# Patient Record
Sex: FEMALE | Race: BLACK OR AFRICAN AMERICAN | NOT HISPANIC OR LATINO | URBAN - METROPOLITAN AREA
[De-identification: names, ages, dates, MRNs, and addresses within clinical notes are randomized per-mention and may not be internally consistent; named-entity substitution may affect disease eponyms.]

---

## 2023-08-17 ENCOUNTER — APPOINTMENT (OUTPATIENT)
Dept: LAB | Facility: LAB | Age: 58
End: 2023-08-17
Payer: COMMERCIAL

## 2023-08-17 LAB
ALANINE AMINOTRANSFERASE (SGPT) (U/L) IN SER/PLAS: 11 U/L (ref 7–45)
ALBUMIN (G/DL) IN SER/PLAS: 3.7 G/DL (ref 3.4–5)
ALKALINE PHOSPHATASE (U/L) IN SER/PLAS: 113 U/L (ref 33–110)
ANION GAP IN SER/PLAS: 14 MMOL/L (ref 10–20)
ANTI-DNA (DS): 108 IU/ML
APPEARANCE, URINE: ABNORMAL
ASPARTATE AMINOTRANSFERASE (SGOT) (U/L) IN SER/PLAS: 17 U/L (ref 9–39)
BACTERIA, URINE: ABNORMAL /HPF
BASOPHILS (10*3/UL) IN BLOOD BY AUTOMATED COUNT: 0.04 X10E9/L (ref 0–0.1)
BASOPHILS/100 LEUKOCYTES IN BLOOD BY AUTOMATED COUNT: 0.9 % (ref 0–2)
BILIRUBIN TOTAL (MG/DL) IN SER/PLAS: 0.4 MG/DL (ref 0–1.2)
BILIRUBIN, URINE: NEGATIVE
BLOOD, URINE: NEGATIVE
C REACTIVE PROTEIN (MG/L) IN SER/PLAS: 1.1 MG/DL
CALCIDIOL (25 OH VITAMIN D3) (NG/ML) IN SER/PLAS: 21 NG/ML
CALCIUM (MG/DL) IN SER/PLAS: 9.5 MG/DL (ref 8.6–10.6)
CARBON DIOXIDE, TOTAL (MMOL/L) IN SER/PLAS: 29 MMOL/L (ref 21–32)
CHLORIDE (MMOL/L) IN SER/PLAS: 98 MMOL/L (ref 98–107)
COLOR, URINE: YELLOW
COMPLEMENT C3 (MG/DL) IN SER/PLAS: 143 MG/DL (ref 87–200)
COMPLEMENT C4 (MG/DL) IN SER/PLAS: 36 MG/DL (ref 10–50)
CREATININE (MG/DL) IN SER/PLAS: 0.67 MG/DL (ref 0.5–1.05)
CREATININE (MG/DL) IN URINE: 112 MG/DL (ref 20–320)
EOSINOPHILS (10*3/UL) IN BLOOD BY AUTOMATED COUNT: 0.07 X10E9/L (ref 0–0.7)
EOSINOPHILS/100 LEUKOCYTES IN BLOOD BY AUTOMATED COUNT: 1.5 % (ref 0–6)
ERYTHROCYTE DISTRIBUTION WIDTH (RATIO) BY AUTOMATED COUNT: 14.2 % (ref 11.5–14.5)
ERYTHROCYTE MEAN CORPUSCULAR HEMOGLOBIN CONCENTRATION (G/DL) BY AUTOMATED: 33.6 G/DL (ref 32–36)
ERYTHROCYTE MEAN CORPUSCULAR VOLUME (FL) BY AUTOMATED COUNT: 85 FL (ref 80–100)
ERYTHROCYTES (10*6/UL) IN BLOOD BY AUTOMATED COUNT: 5.13 X10E12/L (ref 4–5.2)
GFR FEMALE: >90 ML/MIN/1.73M2
GLUCOSE (MG/DL) IN SER/PLAS: 84 MG/DL (ref 74–99)
GLUCOSE, URINE: NEGATIVE MG/DL
HEMATOCRIT (%) IN BLOOD BY AUTOMATED COUNT: 43.5 % (ref 36–46)
HEMOGLOBIN (G/DL) IN BLOOD: 14.6 G/DL (ref 12–16)
IMMATURE GRANULOCYTES/100 LEUKOCYTES IN BLOOD BY AUTOMATED COUNT: 0.2 % (ref 0–0.9)
KETONES, URINE: NEGATIVE MG/DL
LEUKOCYTE ESTERASE, URINE: ABNORMAL
LEUKOCYTES (10*3/UL) IN BLOOD BY AUTOMATED COUNT: 4.7 X10E9/L (ref 4.4–11.3)
LYMPHOCYTES (10*3/UL) IN BLOOD BY AUTOMATED COUNT: 1.46 X10E9/L (ref 1.2–4.8)
LYMPHOCYTES/100 LEUKOCYTES IN BLOOD BY AUTOMATED COUNT: 31.4 % (ref 13–44)
MONOCYTES (10*3/UL) IN BLOOD BY AUTOMATED COUNT: 0.39 X10E9/L (ref 0.1–1)
MONOCYTES/100 LEUKOCYTES IN BLOOD BY AUTOMATED COUNT: 8.4 % (ref 2–10)
NEUTROPHILS (10*3/UL) IN BLOOD BY AUTOMATED COUNT: 2.68 X10E9/L (ref 1.2–7.7)
NEUTROPHILS/100 LEUKOCYTES IN BLOOD BY AUTOMATED COUNT: 57.6 % (ref 40–80)
NITRITE, URINE: NEGATIVE
NRBC (PER 100 WBCS) BY AUTOMATED COUNT: 0 /100 WBC (ref 0–0)
PH, URINE: 7 (ref 5–8)
PLATELETS (10*3/UL) IN BLOOD AUTOMATED COUNT: 430 X10E9/L (ref 150–450)
POTASSIUM (MMOL/L) IN SER/PLAS: 4.4 MMOL/L (ref 3.5–5.3)
PROTEIN (MG/DL) IN URINE: 22 MG/DL (ref 5–24)
PROTEIN TOTAL: 8.3 G/DL (ref 6.4–8.2)
PROTEIN, URINE: ABNORMAL MG/DL
PROTEIN/CREATININE (MG/MG) IN URINE: 0.2 MG/MG CREAT (ref 0–0.17)
RBC, URINE: <1 /HPF (ref 0–5)
SEDIMENTATION RATE, ERYTHROCYTE: 60 MM/H (ref 0–30)
SODIUM (MMOL/L) IN SER/PLAS: 137 MMOL/L (ref 136–145)
SPECIFIC GRAVITY, URINE: 1.01 (ref 1–1.03)
SQUAMOUS EPITHELIAL CELLS, URINE: 1 /HPF
UREA NITROGEN (MG/DL) IN SER/PLAS: 8 MG/DL (ref 6–23)
UROBILINOGEN, URINE: <2 MG/DL (ref 0–1.9)
WBC, URINE: 3 /HPF (ref 0–5)

## 2023-10-03 ENCOUNTER — APPOINTMENT (OUTPATIENT)
Dept: RADIOLOGY | Facility: HOSPITAL | Age: 58
End: 2023-10-03
Payer: COMMERCIAL

## 2023-10-03 ENCOUNTER — HOSPITAL ENCOUNTER (OUTPATIENT)
Facility: HOSPITAL | Age: 58
Setting detail: OBSERVATION
LOS: 1 days | Discharge: HOME | End: 2023-10-05
Attending: EMERGENCY MEDICINE | Admitting: NURSE PRACTITIONER
Payer: COMMERCIAL

## 2023-10-03 ENCOUNTER — NURSE TRIAGE (OUTPATIENT)
Dept: HOME HEALTH SERVICES | Age: 58
End: 2023-10-03

## 2023-10-03 DIAGNOSIS — J30.2 SEASONAL ALLERGIES: ICD-10-CM

## 2023-10-03 DIAGNOSIS — M32.15: ICD-10-CM

## 2023-10-03 DIAGNOSIS — M54.16 LUMBAR RADICULOPATHY: ICD-10-CM

## 2023-10-03 DIAGNOSIS — I10 PRIMARY HYPERTENSION: ICD-10-CM

## 2023-10-03 DIAGNOSIS — J96.01 ACUTE HYPOXIC RESPIRATORY FAILURE (MULTI): ICD-10-CM

## 2023-10-03 DIAGNOSIS — J44.1 CHRONIC OBSTRUCTIVE PULMONARY DISEASE WITH ACUTE EXACERBATION (MULTI): Primary | ICD-10-CM

## 2023-10-03 PROBLEM — R25.2 CRAMPING OF HANDS: Status: RESOLVED | Noted: 2023-10-03 | Resolved: 2023-10-03

## 2023-10-03 PROBLEM — R42 VERTIGO: Status: RESOLVED | Noted: 2023-10-03 | Resolved: 2023-10-03

## 2023-10-03 PROBLEM — M19.90 INFLAMMATORY ARTHRITIS: Status: ACTIVE | Noted: 2021-09-29

## 2023-10-03 PROBLEM — M17.0 LOCALIZED OSTEOARTHRITIS OF KNEES, BILATERAL: Status: RESOLVED | Noted: 2023-10-03 | Resolved: 2023-10-03

## 2023-10-03 PROBLEM — R00.2 PALPITATIONS: Status: RESOLVED | Noted: 2023-10-03 | Resolved: 2023-10-03

## 2023-10-03 PROBLEM — R59.1 LYMPHADENOPATHY: Status: ACTIVE | Noted: 2023-10-03

## 2023-10-03 PROBLEM — R53.81 PHYSICAL DECONDITIONING: Status: RESOLVED | Noted: 2023-10-03 | Resolved: 2023-10-03

## 2023-10-03 PROBLEM — M35.9 UNDIFFERENTIATED CONNECTIVE TISSUE DISEASE (MULTI): Status: RESOLVED | Noted: 2023-10-03 | Resolved: 2023-10-03

## 2023-10-03 PROBLEM — H52.4 HYPEROPIA WITH PRESBYOPIA OF BOTH EYES: Status: ACTIVE | Noted: 2023-10-03

## 2023-10-03 PROBLEM — L29.9 PRURITUS: Status: RESOLVED | Noted: 2021-09-29 | Resolved: 2023-10-03

## 2023-10-03 PROBLEM — H18.413 ARCUS SENILIS, BILATERAL: Status: ACTIVE | Noted: 2023-10-03

## 2023-10-03 PROBLEM — M25.561 KNEE PAIN, BILATERAL: Status: RESOLVED | Noted: 2023-10-03 | Resolved: 2023-10-03

## 2023-10-03 PROBLEM — M54.30 SCIATICA: Status: RESOLVED | Noted: 2023-10-03 | Resolved: 2023-10-03

## 2023-10-03 PROBLEM — R06.02 SHORTNESS OF BREATH ON EXERTION: Status: RESOLVED | Noted: 2023-10-03 | Resolved: 2023-10-03

## 2023-10-03 PROBLEM — N95.0 POST-MENOPAUSAL BLEEDING: Status: RESOLVED | Noted: 2023-10-03 | Resolved: 2023-10-03

## 2023-10-03 PROBLEM — R73.03 PREDIABETES: Status: RESOLVED | Noted: 2023-10-03 | Resolved: 2023-10-03

## 2023-10-03 PROBLEM — M62.838 MUSCLE SPASM: Status: RESOLVED | Noted: 2023-10-03 | Resolved: 2023-10-03

## 2023-10-03 PROBLEM — R07.9 EXERTIONAL CHEST PAIN: Status: RESOLVED | Noted: 2023-10-03 | Resolved: 2023-10-03

## 2023-10-03 PROBLEM — E55.9 VITAMIN D DEFICIENCY: Status: ACTIVE | Noted: 2023-10-03

## 2023-10-03 PROBLEM — F32.9 MDD (MAJOR DEPRESSIVE DISORDER): Status: ACTIVE | Noted: 2023-10-03

## 2023-10-03 PROBLEM — F41.9 ANXIETY: Status: ACTIVE | Noted: 2023-10-03

## 2023-10-03 PROBLEM — F41.1 GAD (GENERALIZED ANXIETY DISORDER): Status: ACTIVE | Noted: 2023-10-03

## 2023-10-03 PROBLEM — L85.3 XEROSIS CUTIS: Status: ACTIVE | Noted: 2021-09-29

## 2023-10-03 PROBLEM — N84.0 ENDOMETRIAL POLYP: Status: RESOLVED | Noted: 2023-10-03 | Resolved: 2023-10-03

## 2023-10-03 PROBLEM — M25.562 KNEE PAIN, BILATERAL: Status: RESOLVED | Noted: 2023-10-03 | Resolved: 2023-10-03

## 2023-10-03 PROBLEM — E66.9 OBESITY: Status: RESOLVED | Noted: 2023-10-03 | Resolved: 2023-10-03

## 2023-10-03 PROBLEM — M32.9 SLE (SYSTEMIC LUPUS ERYTHEMATOSUS) (MULTI): Status: ACTIVE | Noted: 2023-10-03

## 2023-10-03 PROBLEM — S46.001A INJURY OF RIGHT ROTATOR CUFF: Status: RESOLVED | Noted: 2023-10-03 | Resolved: 2023-10-03

## 2023-10-03 PROBLEM — E07.89 THYROID FULLNESS: Status: RESOLVED | Noted: 2023-10-03 | Resolved: 2023-10-03

## 2023-10-03 PROBLEM — M25.50 JOINT PAIN: Status: RESOLVED | Noted: 2023-10-03 | Resolved: 2023-10-03

## 2023-10-03 PROBLEM — L65.9 NONSCARRING HAIR LOSS, UNSPECIFIED: Status: RESOLVED | Noted: 2021-09-29 | Resolved: 2023-10-03

## 2023-10-03 PROBLEM — E78.5 HLD (HYPERLIPIDEMIA): Status: ACTIVE | Noted: 2023-10-03

## 2023-10-03 PROBLEM — D48.5 NEOPLASM OF UNCERTAIN BEHAVIOR OF SKIN: Status: ACTIVE | Noted: 2021-09-29

## 2023-10-03 PROBLEM — E04.9 GOITER: Status: ACTIVE | Noted: 2023-10-03

## 2023-10-03 PROBLEM — N39.0 URINARY TRACT INFECTION: Status: RESOLVED | Noted: 2023-10-03 | Resolved: 2023-10-03

## 2023-10-03 PROBLEM — D22.9 ATYPICAL MOLE: Status: RESOLVED | Noted: 2023-10-03 | Resolved: 2023-10-03

## 2023-10-03 PROBLEM — H25.13 NUCLEAR SCLEROSIS OF BOTH EYES: Status: RESOLVED | Noted: 2023-10-03 | Resolved: 2023-10-03

## 2023-10-03 PROBLEM — L82.1 OTHER SEBORRHEIC KERATOSIS: Status: RESOLVED | Noted: 2021-09-29 | Resolved: 2023-10-03

## 2023-10-03 PROBLEM — J44.9 COPD (CHRONIC OBSTRUCTIVE PULMONARY DISEASE) (MULTI): Status: ACTIVE | Noted: 2023-10-03

## 2023-10-03 PROBLEM — G51.4 EYELID MYOKYMIA: Status: ACTIVE | Noted: 2023-10-03

## 2023-10-03 PROBLEM — H52.03 HYPEROPIA WITH PRESBYOPIA OF BOTH EYES: Status: ACTIVE | Noted: 2023-10-03

## 2023-10-03 LAB
ALBUMIN SERPL BCP-MCNC: 4 G/DL (ref 3.4–5)
ALP SERPL-CCNC: 124 U/L (ref 33–110)
ALT SERPL W P-5'-P-CCNC: 8 U/L (ref 7–45)
ANION GAP BLDV CALCULATED.4IONS-SCNC: 6 MMOL/L (ref 10–25)
ANION GAP SERPL CALC-SCNC: 15 MMOL/L (ref 10–20)
AST SERPL W P-5'-P-CCNC: 13 U/L (ref 9–39)
BASE EXCESS BLDV CALC-SCNC: 5.6 MMOL/L (ref -2–3)
BASOPHILS # BLD AUTO: 0.03 X10*3/UL (ref 0–0.1)
BASOPHILS NFR BLD AUTO: 0.3 %
BILIRUB SERPL-MCNC: 0.4 MG/DL (ref 0–1.2)
BNP SERPL-MCNC: 8 PG/ML (ref 0–99)
BODY TEMPERATURE: 37 DEGREES CELSIUS
BUN SERPL-MCNC: 13 MG/DL (ref 6–23)
CA-I BLDV-SCNC: 1.15 MMOL/L (ref 1.1–1.33)
CALCIUM SERPL-MCNC: 9.5 MG/DL (ref 8.6–10.6)
CARDIAC TROPONIN I PNL SERPL HS: <3 NG/L (ref 0–34)
CARDIAC TROPONIN I PNL SERPL HS: <3 NG/L (ref 0–34)
CHLORIDE BLDV-SCNC: 97 MMOL/L (ref 98–107)
CHLORIDE SERPL-SCNC: 94 MMOL/L (ref 98–107)
CO2 SERPL-SCNC: 29 MMOL/L (ref 21–32)
CREAT SERPL-MCNC: 0.73 MG/DL (ref 0.5–1.05)
EOSINOPHIL # BLD AUTO: 0 X10*3/UL (ref 0–0.7)
EOSINOPHIL NFR BLD AUTO: 0 %
ERYTHROCYTE [DISTWIDTH] IN BLOOD BY AUTOMATED COUNT: 13.9 % (ref 11.5–14.5)
FLUAV RNA RESP QL NAA+PROBE: NOT DETECTED
FLUBV RNA RESP QL NAA+PROBE: NOT DETECTED
GFR SERPL CREATININE-BSD FRML MDRD: >90 ML/MIN/1.73M*2
GLUCOSE BLDV-MCNC: 178 MG/DL (ref 74–99)
GLUCOSE SERPL-MCNC: 153 MG/DL (ref 74–99)
HCO3 BLDV-SCNC: 32.5 MMOL/L (ref 22–26)
HCT VFR BLD AUTO: 41.4 % (ref 36–46)
HCT VFR BLD EST: 45 % (ref 36–46)
HGB BLD-MCNC: 15 G/DL (ref 12–16)
HGB BLDV-MCNC: 15.1 G/DL (ref 12–16)
IMM GRANULOCYTES # BLD AUTO: 0.04 X10*3/UL (ref 0–0.7)
IMM GRANULOCYTES NFR BLD AUTO: 0.4 % (ref 0–0.9)
LACTATE BLDV-SCNC: 1.6 MMOL/L (ref 0.4–2)
LYMPHOCYTES # BLD AUTO: 0.51 X10*3/UL (ref 1.2–4.8)
LYMPHOCYTES NFR BLD AUTO: 4.7 %
MAGNESIUM SERPL-MCNC: 1.97 MG/DL (ref 1.6–2.4)
MCH RBC QN AUTO: 29.6 PG (ref 26–34)
MCHC RBC AUTO-ENTMCNC: 36.2 G/DL (ref 32–36)
MCV RBC AUTO: 82 FL (ref 80–100)
MONOCYTES # BLD AUTO: 0.72 X10*3/UL (ref 0.1–1)
MONOCYTES NFR BLD AUTO: 6.6 %
NEUTROPHILS # BLD AUTO: 9.55 X10*3/UL (ref 1.2–7.7)
NEUTROPHILS NFR BLD AUTO: 88 %
NRBC BLD-RTO: 0 /100 WBCS (ref 0–0)
OXYHGB MFR BLDV: 37.4 % (ref 45–75)
PCO2 BLDV: 55 MM HG (ref 41–51)
PH BLDV: 7.38 PH (ref 7.33–7.43)
PLATELET # BLD AUTO: 388 X10*3/UL (ref 150–450)
PMV BLD AUTO: 9.5 FL (ref 7.5–11.5)
PO2 BLDV: 28 MM HG (ref 35–45)
POTASSIUM BLDV-SCNC: 3.7 MMOL/L (ref 3.5–5.3)
POTASSIUM SERPL-SCNC: 3.8 MMOL/L (ref 3.5–5.3)
PROT SERPL-MCNC: 8.6 G/DL (ref 6.4–8.2)
RBC # BLD AUTO: 5.06 X10*6/UL (ref 4–5.2)
SAO2 % BLDV: 41 % (ref 45–75)
SARS-COV-2 RNA RESP QL NAA+PROBE: NOT DETECTED
SODIUM BLDV-SCNC: 132 MMOL/L (ref 136–145)
SODIUM SERPL-SCNC: 134 MMOL/L (ref 136–145)
WBC # BLD AUTO: 10.9 X10*3/UL (ref 4.4–11.3)

## 2023-10-03 PROCEDURE — 2500000001 HC RX 250 WO HCPCS SELF ADMINISTERED DRUGS (ALT 637 FOR MEDICARE OP): Mod: SE

## 2023-10-03 PROCEDURE — 93010 ELECTROCARDIOGRAM REPORT: CPT | Performed by: EMERGENCY MEDICINE

## 2023-10-03 PROCEDURE — 2500000001 HC RX 250 WO HCPCS SELF ADMINISTERED DRUGS (ALT 637 FOR MEDICARE OP): Performed by: NURSE PRACTITIONER

## 2023-10-03 PROCEDURE — 85018 HEMOGLOBIN: CPT

## 2023-10-03 PROCEDURE — 99285 EMERGENCY DEPT VISIT HI MDM: CPT | Mod: CS | Performed by: EMERGENCY MEDICINE

## 2023-10-03 PROCEDURE — 87636 SARSCOV2 & INF A&B AMP PRB: CPT

## 2023-10-03 PROCEDURE — 99285 EMERGENCY DEPT VISIT HI MDM: CPT | Performed by: EMERGENCY MEDICINE

## 2023-10-03 PROCEDURE — 2500000004 HC RX 250 GENERAL PHARMACY W/ HCPCS (ALT 636 FOR OP/ED)

## 2023-10-03 PROCEDURE — 93308 TTE F-UP OR LMTD: CPT | Performed by: EMERGENCY MEDICINE

## 2023-10-03 PROCEDURE — 71275 CT ANGIOGRAPHY CHEST: CPT | Performed by: RADIOLOGY

## 2023-10-03 PROCEDURE — 2500000002 HC RX 250 W HCPCS SELF ADMINISTERED DRUGS (ALT 637 FOR MEDICARE OP, ALT 636 FOR OP/ED): Mod: SE

## 2023-10-03 PROCEDURE — 82805 BLOOD GASES W/O2 SATURATION: CPT

## 2023-10-03 PROCEDURE — 2500000002 HC RX 250 W HCPCS SELF ADMINISTERED DRUGS (ALT 637 FOR MEDICARE OP, ALT 636 FOR OP/ED): Performed by: NURSE PRACTITIONER

## 2023-10-03 PROCEDURE — 2500000005 HC RX 250 GENERAL PHARMACY W/O HCPCS: Mod: SE

## 2023-10-03 PROCEDURE — G0378 HOSPITAL OBSERVATION PER HR: HCPCS

## 2023-10-03 PROCEDURE — 1210000001 HC SEMI-PRIVATE ROOM DAILY

## 2023-10-03 PROCEDURE — 84484 ASSAY OF TROPONIN QUANT: CPT

## 2023-10-03 PROCEDURE — 2500000004 HC RX 250 GENERAL PHARMACY W/ HCPCS (ALT 636 FOR OP/ED): Mod: SE

## 2023-10-03 PROCEDURE — 36415 COLL VENOUS BLD VENIPUNCTURE: CPT

## 2023-10-03 PROCEDURE — 85025 COMPLETE CBC W/AUTO DIFF WBC: CPT

## 2023-10-03 PROCEDURE — 71046 X-RAY EXAM CHEST 2 VIEWS: CPT | Mod: FY

## 2023-10-03 PROCEDURE — 83735 ASSAY OF MAGNESIUM: CPT

## 2023-10-03 PROCEDURE — 84295 ASSAY OF SERUM SODIUM: CPT

## 2023-10-03 PROCEDURE — 84075 ASSAY ALKALINE PHOSPHATASE: CPT

## 2023-10-03 PROCEDURE — 71275 CT ANGIOGRAPHY CHEST: CPT

## 2023-10-03 PROCEDURE — 76604 US EXAM CHEST: CPT

## 2023-10-03 PROCEDURE — 2550000001 HC RX 255 CONTRASTS: Mod: SE

## 2023-10-03 PROCEDURE — 84132 ASSAY OF SERUM POTASSIUM: CPT | Mod: 59

## 2023-10-03 PROCEDURE — 71046 X-RAY EXAM CHEST 2 VIEWS: CPT | Performed by: STUDENT IN AN ORGANIZED HEALTH CARE EDUCATION/TRAINING PROGRAM

## 2023-10-03 PROCEDURE — 99223 1ST HOSP IP/OBS HIGH 75: CPT | Performed by: NURSE PRACTITIONER

## 2023-10-03 PROCEDURE — 80053 COMPREHEN METABOLIC PANEL: CPT

## 2023-10-03 PROCEDURE — 83880 ASSAY OF NATRIURETIC PEPTIDE: CPT

## 2023-10-03 RX ORDER — GABAPENTIN 300 MG/1
300 CAPSULE ORAL 3 TIMES DAILY
COMMUNITY
Start: 2023-08-17

## 2023-10-03 RX ORDER — DICLOFENAC SODIUM 10 MG/G
4 GEL TOPICAL 4 TIMES DAILY PRN
COMMUNITY
Start: 2023-02-11 | End: 2023-10-03 | Stop reason: WASHOUT

## 2023-10-03 RX ORDER — DICLOFENAC SODIUM 10 MG/G
4 GEL TOPICAL 4 TIMES DAILY
Status: DISCONTINUED | OUTPATIENT
Start: 2023-10-03 | End: 2023-10-05 | Stop reason: HOSPADM

## 2023-10-03 RX ORDER — LIDOCAINE 560 MG/1
1 PATCH PERCUTANEOUS; TOPICAL; TRANSDERMAL DAILY
COMMUNITY
End: 2023-10-05 | Stop reason: HOSPADM

## 2023-10-03 RX ORDER — LISINOPRIL 20 MG/1
20 TABLET ORAL DAILY
COMMUNITY
End: 2023-10-03 | Stop reason: ALTCHOICE

## 2023-10-03 RX ORDER — PREDNISONE 10 MG/1
40 TABLET ORAL DAILY
Status: DISCONTINUED | OUTPATIENT
Start: 2023-10-04 | End: 2023-10-05 | Stop reason: HOSPADM

## 2023-10-03 RX ORDER — HYDROXYCHLOROQUINE SULFATE 200 MG/1
1 TABLET, FILM COATED ORAL 2 TIMES DAILY
COMMUNITY
Start: 2015-10-12 | End: 2023-10-03 | Stop reason: WASHOUT

## 2023-10-03 RX ORDER — CYCLOBENZAPRINE HCL 5 MG
5 TABLET ORAL NIGHTLY PRN
COMMUNITY
End: 2023-10-03 | Stop reason: ALTCHOICE

## 2023-10-03 RX ORDER — PREDNISONE 20 MG/1
40 TABLET ORAL ONCE
Status: COMPLETED | OUTPATIENT
Start: 2023-10-03 | End: 2023-10-03

## 2023-10-03 RX ORDER — GABAPENTIN 300 MG/1
300 CAPSULE ORAL 3 TIMES DAILY
Status: DISCONTINUED | OUTPATIENT
Start: 2023-10-03 | End: 2023-10-03

## 2023-10-03 RX ORDER — LOSARTAN POTASSIUM 25 MG/1
25 TABLET ORAL DAILY
Status: DISCONTINUED | OUTPATIENT
Start: 2023-10-03 | End: 2023-10-05 | Stop reason: HOSPADM

## 2023-10-03 RX ORDER — ACETAMINOPHEN 160 MG
10 TABLET,CHEWABLE ORAL DAILY
Status: DISCONTINUED | OUTPATIENT
Start: 2023-10-03 | End: 2023-10-05 | Stop reason: HOSPADM

## 2023-10-03 RX ORDER — BISMUTH SUBSALICYLATE 262 MG
1 TABLET,CHEWABLE ORAL DAILY
Status: DISCONTINUED | OUTPATIENT
Start: 2023-10-03 | End: 2023-10-04 | Stop reason: CLARIF

## 2023-10-03 RX ORDER — GABAPENTIN 300 MG/1
300 CAPSULE ORAL 3 TIMES DAILY PRN
Status: DISCONTINUED | OUTPATIENT
Start: 2023-10-03 | End: 2023-10-05 | Stop reason: HOSPADM

## 2023-10-03 RX ORDER — PANTOPRAZOLE SODIUM 40 MG/1
40 TABLET, DELAYED RELEASE ORAL
Status: DISCONTINUED | OUTPATIENT
Start: 2023-10-04 | End: 2023-10-05 | Stop reason: HOSPADM

## 2023-10-03 RX ORDER — HYDROCHLOROTHIAZIDE 25 MG/1
25 TABLET ORAL DAILY
Status: DISCONTINUED | OUTPATIENT
Start: 2023-10-03 | End: 2023-10-05 | Stop reason: HOSPADM

## 2023-10-03 RX ORDER — DULOXETIN HYDROCHLORIDE 60 MG/1
60 CAPSULE, DELAYED RELEASE ORAL DAILY
Status: ON HOLD | COMMUNITY
Start: 2023-08-02 | End: 2023-10-05 | Stop reason: SDUPTHER

## 2023-10-03 RX ORDER — HYDROXYCHLOROQUINE SULFATE 200 MG/1
200 TABLET, FILM COATED ORAL 2 TIMES DAILY
Status: DISCONTINUED | OUTPATIENT
Start: 2023-10-03 | End: 2023-10-05 | Stop reason: HOSPADM

## 2023-10-03 RX ORDER — LORAZEPAM 2 MG/ML
1 INJECTION INTRAMUSCULAR ONCE
Status: COMPLETED | OUTPATIENT
Start: 2023-10-03 | End: 2023-10-03

## 2023-10-03 RX ORDER — METHOTREXATE 2.5 MG/1
10 TABLET ORAL
COMMUNITY
Start: 2023-02-11 | End: 2023-10-03 | Stop reason: WASHOUT

## 2023-10-03 RX ORDER — ALBUTEROL SULFATE 0.83 MG/ML
2.5 SOLUTION RESPIRATORY (INHALATION) EVERY 4 HOURS PRN
Status: DISCONTINUED | OUTPATIENT
Start: 2023-10-03 | End: 2023-10-05 | Stop reason: HOSPADM

## 2023-10-03 RX ORDER — LORAZEPAM 0.5 MG/1
0.5 TABLET ORAL EVERY 8 HOURS PRN
Status: DISCONTINUED | OUTPATIENT
Start: 2023-10-03 | End: 2023-10-05 | Stop reason: HOSPADM

## 2023-10-03 RX ORDER — LORAZEPAM 0.5 MG/1
0.5 TABLET ORAL EVERY 8 HOURS PRN
COMMUNITY
End: 2024-01-10 | Stop reason: SDUPTHER

## 2023-10-03 RX ORDER — HYDROCHLOROTHIAZIDE 25 MG/1
1 TABLET ORAL DAILY
Status: ON HOLD | COMMUNITY
Start: 2014-06-03 | End: 2023-10-05 | Stop reason: SDUPTHER

## 2023-10-03 RX ORDER — LIDOCAINE 560 MG/1
1 PATCH PERCUTANEOUS; TOPICAL; TRANSDERMAL DAILY
Status: DISCONTINUED | OUTPATIENT
Start: 2023-10-03 | End: 2023-10-05 | Stop reason: HOSPADM

## 2023-10-03 RX ORDER — MECLIZINE HYDROCHLORIDE 25 MG/1
25 TABLET ORAL EVERY 8 HOURS
COMMUNITY
Start: 2023-01-16

## 2023-10-03 RX ORDER — IPRATROPIUM BROMIDE AND ALBUTEROL SULFATE 2.5; .5 MG/3ML; MG/3ML
3 SOLUTION RESPIRATORY (INHALATION)
Status: DISCONTINUED | OUTPATIENT
Start: 2023-10-03 | End: 2023-10-05 | Stop reason: HOSPADM

## 2023-10-03 RX ORDER — DULOXETIN HYDROCHLORIDE 60 MG/1
60 CAPSULE, DELAYED RELEASE ORAL DAILY
Status: DISCONTINUED | OUTPATIENT
Start: 2023-10-03 | End: 2023-10-05 | Stop reason: HOSPADM

## 2023-10-03 RX ORDER — LORAZEPAM 2 MG/ML
INJECTION INTRAMUSCULAR
Status: COMPLETED
Start: 2023-10-03 | End: 2023-10-03

## 2023-10-03 RX ORDER — FLUTICASONE PROPIONATE 50 MCG
1 SPRAY, SUSPENSION (ML) NASAL DAILY
COMMUNITY
Start: 2023-03-20

## 2023-10-03 RX ORDER — FLUTICASONE PROPIONATE 50 MCG
1 SPRAY, SUSPENSION (ML) NASAL DAILY
Status: DISCONTINUED | OUTPATIENT
Start: 2023-10-03 | End: 2023-10-05 | Stop reason: HOSPADM

## 2023-10-03 RX ORDER — NAPROXEN SODIUM 220 MG/1
324 TABLET, FILM COATED ORAL ONCE
Status: COMPLETED | OUTPATIENT
Start: 2023-10-03 | End: 2023-10-03

## 2023-10-03 RX ORDER — IPRATROPIUM BROMIDE AND ALBUTEROL SULFATE 2.5; .5 MG/3ML; MG/3ML
3 SOLUTION RESPIRATORY (INHALATION) EVERY 20 MIN
Status: COMPLETED | OUTPATIENT
Start: 2023-10-03 | End: 2023-10-03

## 2023-10-03 RX ORDER — LISINOPRIL 20 MG/1
20 TABLET ORAL DAILY
Status: DISCONTINUED | OUTPATIENT
Start: 2023-10-03 | End: 2023-10-03

## 2023-10-03 RX ORDER — LOSARTAN POTASSIUM 25 MG/1
25 TABLET ORAL DAILY
Status: ON HOLD | COMMUNITY
End: 2023-10-05 | Stop reason: SDUPTHER

## 2023-10-03 RX ORDER — AZITHROMYCIN 500 MG/1
500 TABLET, FILM COATED ORAL
Status: DISCONTINUED | OUTPATIENT
Start: 2023-10-03 | End: 2023-10-05 | Stop reason: HOSPADM

## 2023-10-03 RX ADMIN — Medication: at 06:10

## 2023-10-03 RX ADMIN — IOHEXOL 70 ML: 350 INJECTION, SOLUTION INTRAVENOUS at 10:09

## 2023-10-03 RX ADMIN — PREDNISONE 40 MG: 20 TABLET ORAL at 06:19

## 2023-10-03 RX ADMIN — AZITHROMYCIN MONOHYDRATE 500 MG: 500 TABLET ORAL at 15:31

## 2023-10-03 RX ADMIN — FLUTICASONE PROPIONATE 1 SPRAY: 50 SPRAY, METERED NASAL at 15:29

## 2023-10-03 RX ADMIN — LORAZEPAM 1 MG: 2 INJECTION INTRAMUSCULAR at 23:25

## 2023-10-03 RX ADMIN — ASPIRIN 81 MG CHEWABLE TABLET 324 MG: 81 TABLET CHEWABLE at 06:19

## 2023-10-03 RX ADMIN — IPRATROPIUM BROMIDE AND ALBUTEROL SULFATE 3 ML: .5; 3 SOLUTION RESPIRATORY (INHALATION) at 08:18

## 2023-10-03 RX ADMIN — GABAPENTIN 300 MG: 300 CAPSULE ORAL at 15:30

## 2023-10-03 RX ADMIN — HYDROXYCHLOROQUINE SULFATE 200 MG: 200 TABLET, FILM COATED ORAL at 21:21

## 2023-10-03 RX ADMIN — LIDOCAINE 1 PATCH: 4 PATCH TOPICAL at 09:18

## 2023-10-03 RX ADMIN — LORAZEPAM 1 MG: 2 INJECTION INTRAMUSCULAR; INTRAVENOUS at 23:25

## 2023-10-03 RX ADMIN — HYDROCHLOROTHIAZIDE 25 MG: 25 TABLET ORAL at 15:31

## 2023-10-03 RX ADMIN — IPRATROPIUM BROMIDE AND ALBUTEROL SULFATE 3 ML: .5; 3 SOLUTION RESPIRATORY (INHALATION) at 14:05

## 2023-10-03 RX ADMIN — DULOXETINE HYDROCHLORIDE 60 MG: 60 CAPSULE, DELAYED RELEASE ORAL at 15:32

## 2023-10-03 RX ADMIN — IPRATROPIUM BROMIDE AND ALBUTEROL SULFATE 3 ML: .5; 3 SOLUTION RESPIRATORY (INHALATION) at 06:30

## 2023-10-03 RX ADMIN — IPRATROPIUM BROMIDE AND ALBUTEROL SULFATE 3 ML: .5; 3 SOLUTION RESPIRATORY (INHALATION) at 06:19

## 2023-10-03 RX ADMIN — LOSARTAN POTASSIUM 25 MG: 25 TABLET, FILM COATED ORAL at 15:32

## 2023-10-03 RX ADMIN — HYDROXYCHLOROQUINE SULFATE 200 MG: 200 TABLET, FILM COATED ORAL at 15:32

## 2023-10-03 RX ADMIN — IPRATROPIUM BROMIDE AND ALBUTEROL SULFATE 3 ML: .5; 3 SOLUTION RESPIRATORY (INHALATION) at 06:48

## 2023-10-03 ASSESSMENT — PAIN DESCRIPTION - FREQUENCY: FREQUENCY: CONSTANT/CONTINUOUS

## 2023-10-03 ASSESSMENT — ENCOUNTER SYMPTOMS
COUGH: 1
CHEST TIGHTNESS: 1
SHORTNESS OF BREATH: 1

## 2023-10-03 ASSESSMENT — LIFESTYLE VARIABLES
EVER HAD A DRINK FIRST THING IN THE MORNING TO STEADY YOUR NERVES TO GET RID OF A HANGOVER: NO
HAVE PEOPLE ANNOYED YOU BY CRITICIZING YOUR DRINKING: NO
EVER FELT BAD OR GUILTY ABOUT YOUR DRINKING: NO
HAVE YOU EVER FELT YOU SHOULD CUT DOWN ON YOUR DRINKING: NO

## 2023-10-03 ASSESSMENT — PAIN - FUNCTIONAL ASSESSMENT: PAIN_FUNCTIONAL_ASSESSMENT: 0-10

## 2023-10-03 ASSESSMENT — PAIN DESCRIPTION - DESCRIPTORS
DESCRIPTORS: SHARP
DESCRIPTORS: ACHING;HEAVINESS;TIGHTNESS
DESCRIPTORS: SHARP

## 2023-10-03 ASSESSMENT — COLUMBIA-SUICIDE SEVERITY RATING SCALE - C-SSRS
1. IN THE PAST MONTH, HAVE YOU WISHED YOU WERE DEAD OR WISHED YOU COULD GO TO SLEEP AND NOT WAKE UP?: NO
6. HAVE YOU EVER DONE ANYTHING, STARTED TO DO ANYTHING, OR PREPARED TO DO ANYTHING TO END YOUR LIFE?: NO
2. HAVE YOU ACTUALLY HAD ANY THOUGHTS OF KILLING YOURSELF?: NO

## 2023-10-03 ASSESSMENT — PAIN SCALES - GENERAL
PAINLEVEL_OUTOF10: 10 - WORST POSSIBLE PAIN
PAINLEVEL_OUTOF10: 0 - NO PAIN

## 2023-10-03 ASSESSMENT — PAIN DESCRIPTION - LOCATION: LOCATION: CHEST

## 2023-10-03 NOTE — ED PROCEDURE NOTE
Procedure    Performed by: Roopa Avendaño MD  Authorized by: Lindsey Doss MD    Procedure: Cardiac Ultrasound    Findings:   Views: parasternal long, parasternal short, apical four and subxiphoid  The pericardial space was visualized and was NEGATIVE for a significant pericardial effusion.  Activity: Ventricular contractions were visualized.        Impression:  Cardiac: The focused cardiac ultrasound was INDETERMINATE given inadequate visualization.  Procedure: Thoracic Ultrasound    Findings:  R Lung Sliding: The RIGHT chest was evaluated and LUNG SLIDING was visualized.  L Lung Sliding: The LEFT chest was evaluated and LUNG SLIDING was visualized.  R Effusion: The RIGHT chest was evaluated and there was NO PLEURAL EFFUSION.  L Effusion: The LEFT chest was evaluated and there was NO PLEURAL EFFUSION.  A-lines: The RIGHT chest was evaluated and multiple A-LINES were visualized  B-lines: The RIGHT chest was evaluated and there were NO B-LINES visualized  R Consolidation: The RIGHT chest was evaluated and there was NO RIGHT CONSOLIDATION.  L Consolidation: The LEFT chest was evaluated and there was NO LEFT CONSOLIDATION.    Impression:  Thorax: The focused thoracic ultrasound exam was NORMAL.                   Roopa Avendaño MD  Resident  10/03/23 5991

## 2023-10-03 NOTE — H&P
History Of Present Illness  Lisa Abreu is a 58 y.o. female with PMHx: Lupus fibromyalgia vertigo HTN, HLD, chronic knee pain presenting with complaint of shortness of breath states feels she cannot breathe get the oxygen in or out she also complains of a moist productive cough with clear mucus.  Upon arrival to the ED patient was noted to be hypoxic running high 80s low 90s was placed on 2 L nasal cannula.  Patient states ever since she had COVID a couple years ago she has been admitted and told she has been hypoxic although she is never really followed up outpatient for it she states she just drinks a lot of Gatorade to keep herself from being dehydrated.  She does however smoke about 5 or more cigarettes a day for the past 20 years.  Patient to be admitted acute exacerbation COPD.     Past Medical History  She has a past medical history of COPD (chronic obstructive pulmonary disease) (CMS/[facility]), Cramping of hands (10/03/2023), Endometrial polyp (10/03/2023), Exertional chest pain (10/03/2023), HLD (hyperlipidemia), Hypertension, Injury of right rotator cuff (10/03/2023), Knee pain, bilateral (10/03/2023), Localized osteoarthritis of knees, bilateral (10/03/2023), Nuclear sclerosis of both eyes (10/03/2023), Obesity (10/03/2023), Persistent insomnia (03/01/2010), Post-menopausal bleeding (10/03/2023), Prediabetes (10/03/2023), Pruritus (09/29/2021), Sciatica (10/03/2023), Shortness of breath on exertion (10/03/2023), SLE (systemic lupus erythematosus) (CMS/[facility]), Social phobia (03/01/2010), Thyroid fullness (10/03/2023), Undifferentiated connective tissue disease (CMS/[facility]) (10/03/2023), and Vertigo (10/03/2023).    Surgical History  She has a past surgical history that includes Cholecystectomy (11/28/2016); Hernia repair; and Cholecystectomy.     Social History  She reports that she has been smoking cigarettes. She has never used smokeless tobacco. She reports that she does not drink alcohol and does not use  drugs.    Family History  No family history on file.     Allergies  Patient has no known allergies.    Review of Systems   Respiratory:  Positive for cough, chest tightness and shortness of breath.    All other systems reviewed and are negative.       Physical Exam  Constitutional:       Appearance: She is normal weight.   HENT:      Head: Normocephalic.      Nose: Nose normal.      Mouth/Throat:      Mouth: Mucous membranes are dry.      Pharynx: Oropharynx is clear.   Eyes:      Pupils: Pupils are equal, round, and reactive to light.   Cardiovascular:      Rate and Rhythm: Normal rate and regular rhythm.      Pulses: Normal pulses.      Heart sounds: Normal heart sounds.   Pulmonary:      Effort: Pulmonary effort is normal.      Breath sounds: Normal breath sounds.   Abdominal:      General: Abdomen is flat. Bowel sounds are normal.      Palpations: Abdomen is soft.   Musculoskeletal:         General: Normal range of motion.      Cervical back: Normal range of motion.   Skin:     General: Skin is warm and dry.      Capillary Refill: Capillary refill takes less than 2 seconds.   Neurological:      Mental Status: She is alert.   Psychiatric:         Mood and Affect: Mood normal.         Behavior: Behavior normal.                    Last Recorded Vitals  Blood pressure 138/82, pulse 85, temperature 36.8 °C (98.2 °F), temperature source Oral, resp. rate 22, height 1.524 m (5'), weight 81.6 kg (180 lb), SpO2 90 %.  Intake/Output last 3 Shifts:  No intake/output data recorded.    Relevant Results  Lab Results   Component Value Date    WBC 10.9 10/03/2023    HGB 15.0 10/03/2023    HCT 41.4 10/03/2023    MCV 82 10/03/2023     10/03/2023      Lab Results   Component Value Date    GLUCOSE 153 (H) 10/03/2023    CALCIUM 9.5 10/03/2023     (L) 10/03/2023    K 3.8 10/03/2023    CO2 29 10/03/2023    CL 94 (L) 10/03/2023    BUN 13 10/03/2023    CREATININE 0.73 10/03/2023     Point of Care Ultrasound    Result Date:  10/3/2023  Roopa Avendaño MD     10/3/2023  1:08 PM Performed by: Roopa Avendaño MD Authorized by: Lindsey Doss MD  Procedure: Cardiac Ultrasound Findings:  Views: parasternal long, parasternal short, apical four and subxiphoid The pericardial space was visualized and was NEGATIVE for a significant pericardial effusion. Activity: Ventricular contractions were visualized. Impression: Cardiac: The focused cardiac ultrasound was INDETERMINATE given inadequate visualization. Procedure: Thoracic Ultrasound Findings: R Lung Sliding: The RIGHT chest was evaluated and LUNG SLIDING was visualized. L Lung Sliding: The LEFT chest was evaluated and LUNG SLIDING was visualized. R Effusion: The RIGHT chest was evaluated and there was NO PLEURAL EFFUSION. L Effusion: The LEFT chest was evaluated and there was NO PLEURAL EFFUSION. A-lines: The RIGHT chest was evaluated and multiple A-LINES were visualized B-lines: The RIGHT chest was evaluated and there were NO B-LINES visualized R Consolidation: The RIGHT chest was evaluated and there was NO RIGHT CONSOLIDATION. L Consolidation: The LEFT chest was evaluated and there was NO LEFT CONSOLIDATION. Impression: Thorax: The focused thoracic ultrasound exam was NORMAL.     CT angio chest for pulmonary embolism    Result Date: 10/3/2023  Interpreted By:  Amilcar Gamble, STUDY: CT ANGIO CHEST FOR PULMONARY EMBOLISM;  10/3/2023 10:08 am   INDICATION: Signs/Symptoms:r/o PE.   COMPARISON: CT dated 06/24/2014   ACCESSION NUMBER(S): [id number]   ORDERING CLINICIAN: MARCIA HASKINS   TECHNIQUE: Helical data acquisition of the chest was obtained after intravenous administration of 70 mL Omnipaque 350, as per PE protocol. Images were reformatted in coronal and sagittal planes. Axial and coronal maximum intensity projection (MIP) images were created and reviewed.   FINDINGS: POTENTIAL LIMITATIONS OF THE STUDY: None   HEART AND VESSELS: There are no discrete filling defects within main pulmonary  artery and its branches to suggest acute pulmonary embolism. Main pulmonary artery and its branches are normal in caliber.   The thoracic aorta normal in course and caliber.Scattered mild calcifications of the aorta. No coronary artery calcifications are seen. Please note, the study is not optimized for evaluation of coronary arteries.   The cardiac chambers are not enlarged.   Trace pericardial effusion.   MEDIASTINUM AND ASA, LOWER NECK AND AXILLA: The visualized thyroid gland is within normal limits. No evidence of thoracic lymphadenopathy by CT criteria. Small hiatal hernia. Remaining esophagus is within normal limits.   LUNGS AND AIRWAYS: The trachea and central airways are patent. No endobronchial lesion is seen.Mild apical emphysema, right-greater-than-left. Dependent atelectasis. Stable to increase areas linear parenchymal opacity and consolidation involving the bilateral lower lobes.   No pleural effusion or pneumothorax.     UPPER ABDOMEN: Numerous punctate calcifications in the spleen likely represent calcified granulomas.       CHEST WALL AND OSSEOUS STRUCTURES: There is bilateral axillary lymphadenopathy, similar to prior exam. No acute osseous pathology.There are no suspicious osseous lesions.       1. No evidence of acute pulmonary embolism. 2. Trace pericardial effusion. 3. Stable bilateral axillary lymphadenopathy. 4. Mild apical emphysema. 5. Mild atherosclerosis.     MACRO: None   Signed by: Amilcar Gamble 10/3/2023 10:45 AM Dictation workstation:   [id number]    XR chest 2 views    Result Date: 10/3/2023  Interpreted By:  Emeterio Graff and Stephens Katherine STUDY: XR CHEST 2 VIEWS;  10/3/2023 7:05 am   INDICATION: Signs/Symptoms:chest pain and SOB.   COMPARISON: Chest radiograph 01/15/2023   ACCESSION NUMBER(S): [id number]   ORDERING CLINICIAN: MARCIA HASKINS   FINDINGS: PA and lateral radiographs of the chest were provided. Surgical clips overlie the right upper quadrant.   CARDIOMEDIASTINAL  SILHOUETTE: Cardiomediastinal silhouette is stable in size and configuration.   LUNGS: Hazy and linear bibasilar opacities favored to represent atelectasis/scarring. There is mild blunting of bilateral costophrenic angles posteriorly, suggestive of trace/small pleural effusions. Otherwise, no pulmonary edema, new focal consolidation or pneumothorax.   ABDOMEN: No remarkable upper abdominal findings.   BONES: No acute osseous changes.       1.  Similar mild bibasilar bandlike atelectasis/scarring with suggestion of trace/small pleural effusions.   I personally reviewed the images/study and I agree with the findings as stated. This study was interpreted at [facility], [place], [place].   MACRO: None   Signed by: Emeterio Graff 10/3/2023 7:30 AM Dictation workstation:   [id number]    Scheduled medications  azithromycin, 500 mg, oral, q24h ARLINE  diclofenac sodium, 4 g, Topical, 4x daily  DULoxetine, 60 mg, oral, Daily  fluticasone, 1 spray, Each Nostril, Daily  gabapentin, 300 mg, oral, TID  hydroCHLOROthiazide, 25 mg, oral, Daily  hydroxychloroquine, 200 mg, oral, BID  ipratropium-albuteroL, 3 mL, nebulization, q6h  lidocaine, 1 patch, transdermal, Daily  loratadine, 10 mg, oral, Daily  losartan, 25 mg, oral, Daily  multivitamin, 1 tablet, oral, Daily  predniSONE, 40 mg, oral, Daily      Continuous medications  oxygen, , Last Rate: 2 mL/hr at 10/03/23 0610      PRN medications  PRN medications: albuterol, LORazepam    Lisa Abreu is a 58 y.o. female with PMHx: Lupus fibromyalgia vertigo HTN, HLD, chronic knee pain presenting with complaint of shortness of breath states feels she cannot breathe get the oxygen in or out she also complains of a moist productive cough with clear mucus.  Upon arrival to the ED patient was noted to be hypoxic running high 80s low 90s was placed on 2 L nasal cannula.  Patient states ever since she had COVID a couple years ago she has been admitted and  told she has been hypoxic although she is never really followed up outpatient for it she states she just drinks a lot of Gatorade to keep herself from being dehydrated.  She does however smoke about 5 or more cigarettes a day for the past 20 years.  Patient to be admitted acute exacerbation COPD.    Assessment/Plan:    Acute COPD  Hypoxia  -pulse ox 85-90 on room air--running about 95% on 2l  -will give prednisone 40mg daily  -will start on azithromycin 500mg daily  -continue with oxygen and eval for home needs  -duoneb q6 and albuterol prn    SLE  Chronic pain  -c/w plaquenil 200mg daily  -c/w gabapentin 300mg tid (Patient wants it to be prn while in hospital)    HTN  HLD  -c/w losartan 25mg daily  -c/w aspirin 81mg daily    Anxiety  Depression  Chronic pain  -c/w prn Ativan (OARRS checked)  -c/w duloxetine 60mg daily    Fluids: monitor and replete as needed  Electrolytes: monitor and replete as needed  Nutrition: Regular diet   GI prophylaxis: protonix prophylactic while on steroids  DVT prophylaxis: SCD  Code Status: Full Code  PCP:   Pharmacy    Disposition:  Admitted for above diagnoses. Plan per above. Anticipate hospitalization >2 midnights.       Briana Moon, APRN-CNP        Assessment/Plan   Principal Problem:    Chronic obstructive pulmonary disease with acute exacerbation (CMS/HCC)  Active Problems:    COPD (chronic obstructive pulmonary disease) (CMS/HCC)             I spent 75 minutes in the professional and overall care of this patient.      Briana Moon, APRN-CNP

## 2023-10-03 NOTE — Clinical Note
Is the patient on isolation?: No  Do you expect the patient to require telemetry (informational-only for bed management): No

## 2023-10-03 NOTE — SIGNIFICANT EVENT
Hospital at home    Discussed hospital at home with patient and she was agreeable to this course request sent

## 2023-10-03 NOTE — ED NOTES
Pharmacy Medication History Review    Lisa Abreu is a 58 y.o. female admitted for Chronic obstructive pulmonary disease with acute exacerbation (CMS/[facility]). Pharmacy reviewed the patient's [id number] medications and allergies for accuracy.    The list below reflectives the updated PTA list. Please review each medication in order reconciliation for additional clarification and justification.  Prior to Admission Medications   Prescriptions Last Dose Informant Patient Reported? Taking?   DULoxetine (Cymbalta) 60 mg DR capsule 10/2/2023 at 0900 Self Yes Yes   Sig: Take 1 capsule (60 mg) by mouth once daily.   LORazepam (Ativan) 0.5 mg tablet 9/27/2023 Self Yes Yes   Sig: Take 1 tablet (0.5 mg) by mouth every 8 hours if needed.   cyclobenzaprine (Flexeril) 5 mg tablet Unknown Self Yes Uknown   Sig: Take 1 tablet (5 mg) by mouth as needed at bedtime for muscle spasms.   fluticasone (Flonase) 50 mcg/actuation nasal spray 10/3/2023 Self Yes Yes   Sig: Administer 1 spray into each nostril once daily.   gabapentin (Neurontin) 300 mg capsule 10/2/2023 at 0900 Self Yes Yes   Sig: Take 1 capsule (300 mg) by mouth 3 times a day.   hydroCHLOROthiazide (HYDRODiuril) 25 mg tablet 10/2/2023 at 0900 Self Yes Yes   Sig: Take 1 tablet (25 mg) by mouth once daily.   lidocaine 4 % patch 10/3/2023 Self Yes Yes   Sig: Place 1 patch on the skin once daily. Remove & discard patch within 12 hours or as directed by MD.   losartan (Cozaar) 25 mg tablet 10/2/2023 at 0900 Self Yes Yes   Sig: Take 1 tablet (25 mg) by mouth once daily.   meclizine (Antivert) 25 mg tablet 10/1/2023 Self Yes Yes   Sig: Take 1 tablet (25 mg) by mouth every 8 hours.   multivitamin capsule 10/2/2023 at 0900 Self Yes Yes   Sig: Take 1 capsule by mouth once daily.      Facility-Administered Medications: None        The list below reflectives the updated allergy list. Please review each documented allergy for additional clarification and  justification.  Allergies  Reviewed by Briana Moon, CHUY-CNP on 10/3/2023   No Known Allergies         Below are additional concerns with the patient's PTA list.  Patient was a reliable historian. She did state she still takes Cyclobenzaprine 5 mg as needed however the last fill date is listed as 1/14/23.    Gissel Traore, PharmD

## 2023-10-03 NOTE — ED PROVIDER NOTES
HPI   Chief Complaint   Patient presents with    Chest Pain       Patient is a 58-year-old female with a past medical history of SLE, arthritis, and hypertension who presented to the ED for chest pain.  Patient has noted difficulty breathing and pleuritic chest pain beginning last night.  Patient notes midsternal pleuritic chest pain that feels like a pressure type sensation and feels pressure-like pain in her back as well.  Patient has not taken anything for her pain.  Patient notes that every day tobacco use.  Notes cough with nonbloody mucus.  Patient notes that she lives alone at home.  Denies history of blood clots, edema, recent surgery, or recent travel.  Denied fevers, chills, headache, nausea, vomiting, abdominal pain, dysuria, and abnormal bowel movements.                          No data recorded                Patient History   No past medical history on file.  Past Surgical History:   Procedure Laterality Date    CHOLECYSTECTOMY  11/28/2016    Cholecystectomy     No family history on file.  Social History     Tobacco Use    Smoking status: Every Day     Types: Cigarettes    Smokeless tobacco: Never   Vaping Use    Vaping Use: Never used   Substance Use Topics    Alcohol use: Never    Drug use: Not on file       Physical Exam   ED Triage Vitals   Temp Heart Rate Resp BP   10/03/23 0058 10/03/23 0058 10/03/23 0058 10/03/23 0058   36.1 °C (97 °F) 102 18 93/70      SpO2 Temp Source Heart Rate Source Patient Position   10/03/23 0058 10/03/23 0058 -- --   94 % Tympanic        BP Location FiO2 (%)     -- 10/03/23 0600      28 %       Physical Exam  Constitutional:       Comments: Dyspnea   HENT:      Head: Normocephalic and atraumatic.   Cardiovascular:      Rate and Rhythm: Regular rhythm. Tachycardia present.      Pulses:           Radial pulses are 2+ on the right side and 2+ on the left side.        Dorsalis pedis pulses are 2+ on the right side and 2+ on the left side.        Posterior tibial pulses are  2+ on the right side and 2+ on the left side.      Heart sounds: Normal heart sounds.   Pulmonary:      Effort: Respiratory distress present.      Breath sounds: Examination of the right-lower field reveals wheezing. Wheezing present.      Comments: Significant cough, diffuse coarse breath sounds  Chest:      Chest wall: No edema.   Abdominal:      General: Bowel sounds are normal.      Palpations: Abdomen is soft.   Skin:     General: Skin is warm and dry.      Capillary Refill: Capillary refill takes less than 2 seconds.   Neurological:      General: No focal deficit present.         ED Course & MDM        Medical Decision Making  Patient is a 58-year-old female with a past with history of SLE, arthritis, and hypertension who presents to the ED for chest pain.  Patient is tachycardic at 102 and satting 86% on room air.  Patient placed on 2 L nasal cannula.  Physical exam findings significant for right lower lobe wheezing.  Low clinical concern for aortic dissection and pneumothorax.  With tachycardia and a new oxygen requirement, CT PE ordered to evaluate for PE.  Lower clinical concern for ACS with unremarkable EKG, pending troponins and BNP.  Patient ordered aspirin.  CXR ordered to evaluate for pneumonia.  Venous full panel displayed hypercapnia but normal pH.  Patient initiated on DuoNebs and steroids for possible undiagnosed COPD exacerbation.  Viral testing ordered and pending.  Patient ordered Tylenol for chest pain.  Patient signed out to oncoming provider in stable condition.    Independent interpretations:  Rate is 94, sinus rhythm, normal axis, no interval prolongation, no st elevation or depression.  When compared to EKG on 1/15/23 review of EKG does not show any signs of STEMI, complete heart block, asystole, V-fib.            Procedure  Procedures     Kevin Benitez MD  Resident  10/03/23 [id number]

## 2023-10-03 NOTE — ED PROVIDER NOTES
I received Lisa Abreu in signout from Dr. Benitez.  Please see the previous note for all HPI, PE and MDM up to the time of signout at 0700.    In brief Lisa Abreu is an 58 y.o. female presenting for   Chief Complaint   Patient presents with    Chest Pain   Patient's chest pain started yesterday and is described as pleuritic in nature located in the substernal region that radiates through to her back.  Initial EKG was without concern for ACS.  Patient was found to have right lower lobe wheezing that did improve with DuoNebs and steroids, but was tachycardic initially with a new oxygen requirement here in the ED.  Venous full panel showed hypercapnia with normal pH.    At the time of signout we were awaiting lab work results, chest x-ray and CT PE. Initial influenza and COVID swabs were negative.  CBC was unremarkable.  CMP was notable for mild hyperglycemia, mild hyponatremia but otherwise unremarkable.  Initial and repeat troponin was negative.  The patient continued to be mildly hypoxic despite 2 L of oxygen via nasal cannula and on repeat exam had diminished breath sounds throughout with some mild wheezing heard.  DuoNeb was repeated at this time with improvement of her shortness of breath and chest pain.  On repeat exam, changes in lung exam was improved.  She continued to require 2 L of oxygen via nasal cannula despite repeat treatment.  CTA chest was not remarkable for PE and did show a trace pericardial effusion and apical emphysema.  The patient was trialed on room air and desaturated into the mid 80s.  A discussion was had with the patient about being admitted to the hospital and she was in agreement with this due to her new oxygen requirement.  The patient was admitted to internal medicine for acute hypoxic respiratory failure in the setting of new onset COPD and oxygen requirement.  Patient was in stable condition upon admission.        Pt Disposition: Admit    Procedures        Lázaro HUYNH  MD Caterina  Resident  10/03/23 1971

## 2023-10-04 PROCEDURE — 2500000002 HC RX 250 W HCPCS SELF ADMINISTERED DRUGS (ALT 637 FOR MEDICARE OP, ALT 636 FOR OP/ED): Mod: MUE | Performed by: NURSE PRACTITIONER

## 2023-10-04 PROCEDURE — 2500000001 HC RX 250 WO HCPCS SELF ADMINISTERED DRUGS (ALT 637 FOR MEDICARE OP): Performed by: STUDENT IN AN ORGANIZED HEALTH CARE EDUCATION/TRAINING PROGRAM

## 2023-10-04 PROCEDURE — 94640 AIRWAY INHALATION TREATMENT: CPT | Mod: 76

## 2023-10-04 PROCEDURE — 2500000004 HC RX 250 GENERAL PHARMACY W/ HCPCS (ALT 636 FOR OP/ED): Performed by: NURSE PRACTITIONER

## 2023-10-04 PROCEDURE — 2500000001 HC RX 250 WO HCPCS SELF ADMINISTERED DRUGS (ALT 637 FOR MEDICARE OP): Performed by: NURSE PRACTITIONER

## 2023-10-04 PROCEDURE — G0378 HOSPITAL OBSERVATION PER HR: HCPCS

## 2023-10-04 PROCEDURE — 2500000005 HC RX 250 GENERAL PHARMACY W/O HCPCS: Performed by: NURSE PRACTITIONER

## 2023-10-04 PROCEDURE — 1100000001 HC PRIVATE ROOM DAILY

## 2023-10-04 PROCEDURE — 2500000002 HC RX 250 W HCPCS SELF ADMINISTERED DRUGS (ALT 637 FOR MEDICARE OP, ALT 636 FOR OP/ED)

## 2023-10-04 PROCEDURE — 2500000002 HC RX 250 W HCPCS SELF ADMINISTERED DRUGS (ALT 637 FOR MEDICARE OP, ALT 636 FOR OP/ED): Performed by: NURSE PRACTITIONER

## 2023-10-04 PROCEDURE — 99232 SBSQ HOSP IP/OBS MODERATE 35: CPT | Performed by: STUDENT IN AN ORGANIZED HEALTH CARE EDUCATION/TRAINING PROGRAM

## 2023-10-04 PROCEDURE — 94760 N-INVAS EAR/PLS OXIMETRY 1: CPT

## 2023-10-04 PROCEDURE — 2500000005 HC RX 250 GENERAL PHARMACY W/O HCPCS

## 2023-10-04 RX ORDER — ACETAMINOPHEN 160 MG/5ML
650 SOLUTION ORAL EVERY 4 HOURS PRN
Status: DISCONTINUED | OUTPATIENT
Start: 2023-10-04 | End: 2023-10-05 | Stop reason: HOSPADM

## 2023-10-04 RX ORDER — ACETAMINOPHEN 650 MG/1
650 SUPPOSITORY RECTAL EVERY 4 HOURS PRN
Status: DISCONTINUED | OUTPATIENT
Start: 2023-10-04 | End: 2023-10-05 | Stop reason: HOSPADM

## 2023-10-04 RX ORDER — ACETAMINOPHEN 325 MG/1
650 TABLET ORAL EVERY 4 HOURS PRN
Status: DISCONTINUED | OUTPATIENT
Start: 2023-10-04 | End: 2023-10-05 | Stop reason: HOSPADM

## 2023-10-04 RX ORDER — MULTIVIT-MIN/IRON FUM/FOLIC AC 7.5 MG-4
1 TABLET ORAL DAILY
Status: DISCONTINUED | OUTPATIENT
Start: 2023-10-04 | End: 2023-10-05 | Stop reason: HOSPADM

## 2023-10-04 RX ADMIN — LIDOCAINE 1 PATCH: 4 PATCH TOPICAL at 15:22

## 2023-10-04 RX ADMIN — DICLOFENAC SODIUM TOPICAL GEL, 1% 1 APPLICATION: 10 GEL TOPICAL at 18:06

## 2023-10-04 RX ADMIN — IPRATROPIUM BROMIDE AND ALBUTEROL SULFATE 3 ML: .5; 3 SOLUTION RESPIRATORY (INHALATION) at 14:17

## 2023-10-04 RX ADMIN — AZITHROMYCIN MONOHYDRATE 500 MG: 500 TABLET ORAL at 15:11

## 2023-10-04 RX ADMIN — GABAPENTIN 300 MG: 300 CAPSULE ORAL at 20:32

## 2023-10-04 RX ADMIN — HYDROCHLOROTHIAZIDE 25 MG: 25 TABLET ORAL at 15:13

## 2023-10-04 RX ADMIN — DICLOFENAC SODIUM TOPICAL GEL, 1% 1 APPLICATION: 10 GEL TOPICAL at 15:14

## 2023-10-04 RX ADMIN — LORAZEPAM 0.5 MG: 0.5 TABLET ORAL at 20:32

## 2023-10-04 RX ADMIN — LIDOCAINE 1 PATCH: 4 PATCH TOPICAL at 03:09

## 2023-10-04 RX ADMIN — ACETAMINOPHEN 650 MG: 325 TABLET, FILM COATED ORAL at 20:32

## 2023-10-04 RX ADMIN — DICLOFENAC SODIUM TOPICAL GEL, 1% 1 APPLICATION: 10 GEL TOPICAL at 20:31

## 2023-10-04 RX ADMIN — IPRATROPIUM BROMIDE AND ALBUTEROL SULFATE 3 ML: .5; 3 SOLUTION RESPIRATORY (INHALATION) at 10:15

## 2023-10-04 RX ADMIN — DULOXETINE HYDROCHLORIDE 60 MG: 60 CAPSULE, DELAYED RELEASE ORAL at 15:13

## 2023-10-04 RX ADMIN — ACETAMINOPHEN 650 MG: 325 TABLET, FILM COATED ORAL at 03:04

## 2023-10-04 RX ADMIN — HYDROXYCHLOROQUINE SULFATE 200 MG: 200 TABLET, FILM COATED ORAL at 20:32

## 2023-10-04 RX ADMIN — PREDNISONE 40 MG: 10 TABLET ORAL at 15:12

## 2023-10-04 RX ADMIN — PANTOPRAZOLE SODIUM 40 MG: 40 TABLET, DELAYED RELEASE ORAL at 15:12

## 2023-10-04 RX ADMIN — LOSARTAN POTASSIUM 25 MG: 25 TABLET, FILM COATED ORAL at 15:12

## 2023-10-04 RX ADMIN — Medication 1 TABLET: at 15:23

## 2023-10-04 SDOH — HEALTH STABILITY: MENTAL HEALTH
STRESS IS WHEN SOMEONE FEELS TENSE, NERVOUS, ANXIOUS, OR CAN'T SLEEP AT NIGHT BECAUSE THEIR MIND IS TROUBLED. HOW STRESSED ARE YOU?: NOT AT ALL

## 2023-10-04 SDOH — HEALTH STABILITY: MENTAL HEALTH: HOW OFTEN DO YOU HAVE A DRINK CONTAINING ALCOHOL?: NEVER

## 2023-10-04 SDOH — HEALTH STABILITY: MENTAL HEALTH: HOW OFTEN DO YOU HAVE 6 OR MORE DRINKS ON ONE OCCASION?: NEVER

## 2023-10-04 SDOH — SOCIAL STABILITY: SOCIAL NETWORK: HOW OFTEN DO YOU GET TOGETHER WITH FRIENDS OR RELATIVES?: MORE THAN THREE TIMES A WEEK

## 2023-10-04 SDOH — ECONOMIC STABILITY: INCOME INSECURITY: IN THE PAST 12 MONTHS, HAS THE ELECTRIC, GAS, OIL, OR WATER COMPANY THREATENED TO SHUT OFF SERVICE IN YOUR HOME?: NO

## 2023-10-04 SDOH — HEALTH STABILITY: MENTAL HEALTH: HOW MANY STANDARD DRINKS CONTAINING ALCOHOL DO YOU HAVE ON A TYPICAL DAY?: PATIENT DOES NOT DRINK

## 2023-10-04 SDOH — SOCIAL STABILITY: SOCIAL INSECURITY: HAS ANYONE EVER THREATENED TO HURT YOUR FAMILY OR YOUR PETS?: NO

## 2023-10-04 SDOH — SOCIAL STABILITY: SOCIAL INSECURITY: ARE THERE ANY APPARENT SIGNS OF INJURIES/BEHAVIORS THAT COULD BE RELATED TO ABUSE/NEGLECT?: NO

## 2023-10-04 SDOH — SOCIAL STABILITY: SOCIAL INSECURITY: DO YOU FEEL ANYONE HAS EXPLOITED OR TAKEN ADVANTAGE OF YOU FINANCIALLY OR OF YOUR PERSONAL PROPERTY?: NO

## 2023-10-04 SDOH — SOCIAL STABILITY: SOCIAL NETWORK: HOW OFTEN DO YOU ATTENT MEETINGS OF THE CLUB OR ORGANIZATION YOU BELONG TO?: 1 TO 4 TIMES PER YEAR

## 2023-10-04 SDOH — ECONOMIC STABILITY: TRANSPORTATION INSECURITY
IN THE PAST 12 MONTHS, HAS LACK OF TRANSPORTATION KEPT YOU FROM MEETINGS, WORK, OR FROM GETTING THINGS NEEDED FOR DAILY LIVING?: NO

## 2023-10-04 SDOH — ECONOMIC STABILITY: TRANSPORTATION INSECURITY
IN THE PAST 12 MONTHS, HAS THE LACK OF TRANSPORTATION KEPT YOU FROM MEDICAL APPOINTMENTS OR FROM GETTING MEDICATIONS?: NO

## 2023-10-04 SDOH — HEALTH STABILITY: PHYSICAL HEALTH: ON AVERAGE, HOW MANY DAYS PER WEEK DO YOU ENGAGE IN MODERATE TO STRENUOUS EXERCISE (LIKE A BRISK WALK)?: 6 DAYS

## 2023-10-04 SDOH — SOCIAL STABILITY: SOCIAL INSECURITY
WITHIN THE LAST YEAR, HAVE TO BEEN RAPED OR FORCED TO HAVE ANY KIND OF SEXUAL ACTIVITY BY YOUR PARTNER OR EX-PARTNER?: NO

## 2023-10-04 SDOH — SOCIAL STABILITY: SOCIAL NETWORK: ARE YOU MARRIED, WIDOWED, DIVORCED, SEPARATED, NEVER MARRIED, OR LIVING WITH A PARTNER?: SEPARATED

## 2023-10-04 SDOH — ECONOMIC STABILITY: INCOME INSECURITY: IN THE LAST 12 MONTHS, WAS THERE A TIME WHEN YOU WERE NOT ABLE TO PAY THE MORTGAGE OR RENT ON TIME?: NO

## 2023-10-04 SDOH — SOCIAL STABILITY: SOCIAL INSECURITY: ARE YOU OR HAVE YOU BEEN THREATENED OR ABUSED PHYSICALLY, EMOTIONALLY, OR SEXUALLY BY ANYONE?: NO

## 2023-10-04 SDOH — ECONOMIC STABILITY: FOOD INSECURITY: WITHIN THE PAST 12 MONTHS, YOU WORRIED THAT YOUR FOOD WOULD RUN OUT BEFORE YOU GOT MONEY TO BUY MORE.: NEVER TRUE

## 2023-10-04 SDOH — SOCIAL STABILITY: SOCIAL INSECURITY: DO YOU FEEL UNSAFE GOING BACK TO THE PLACE WHERE YOU ARE LIVING?: NO

## 2023-10-04 SDOH — SOCIAL STABILITY: SOCIAL NETWORK
DO YOU BELONG TO ANY CLUBS OR ORGANIZATIONS SUCH AS CHURCH GROUPS UNIONS, FRATERNAL OR ATHLETIC GROUPS, OR SCHOOL GROUPS?: YES

## 2023-10-04 SDOH — ECONOMIC STABILITY: HOUSING INSECURITY
IN THE LAST 12 MONTHS, WAS THERE A TIME WHEN YOU DID NOT HAVE A STEADY PLACE TO SLEEP OR SLEPT IN A SHELTER (INCLUDING NOW)?: NO

## 2023-10-04 SDOH — SOCIAL STABILITY: SOCIAL INSECURITY
WITHIN THE LAST YEAR, HAVE YOU BEEN KICKED, HIT, SLAPPED, OR OTHERWISE PHYSICALLY HURT BY YOUR PARTNER OR EX-PARTNER?: NO

## 2023-10-04 SDOH — SOCIAL STABILITY: SOCIAL INSECURITY: WITHIN THE LAST YEAR, HAVE YOU BEEN AFRAID OF YOUR PARTNER OR EX-PARTNER?: NO

## 2023-10-04 SDOH — SOCIAL STABILITY: SOCIAL INSECURITY: DOES ANYONE TRY TO KEEP YOU FROM HAVING/CONTACTING OTHER FRIENDS OR DOING THINGS OUTSIDE YOUR HOME?: NO

## 2023-10-04 SDOH — SOCIAL STABILITY: SOCIAL INSECURITY: WITHIN THE LAST YEAR, HAVE YOU BEEN HUMILIATED OR EMOTIONALLY ABUSED IN OTHER WAYS BY YOUR PARTNER OR EX-PARTNER?: NO

## 2023-10-04 SDOH — SOCIAL STABILITY: SOCIAL NETWORK
IN A TYPICAL WEEK, HOW MANY TIMES DO YOU TALK ON THE PHONE WITH FAMILY, FRIENDS, OR NEIGHBORS?: MORE THAN THREE TIMES A WEEK

## 2023-10-04 SDOH — SOCIAL STABILITY: SOCIAL INSECURITY: ABUSE: ADULT

## 2023-10-04 SDOH — ECONOMIC STABILITY: INCOME INSECURITY: HOW HARD IS IT FOR YOU TO PAY FOR THE VERY BASICS LIKE FOOD, HOUSING, MEDICAL CARE, AND HEATING?: NOT HARD AT ALL

## 2023-10-04 SDOH — SOCIAL STABILITY: SOCIAL INSECURITY: HAVE YOU HAD THOUGHTS OF HARMING ANYONE ELSE?: NO

## 2023-10-04 SDOH — HEALTH STABILITY: PHYSICAL HEALTH: ON AVERAGE, HOW MANY MINUTES DO YOU ENGAGE IN EXERCISE AT THIS LEVEL?: 10 MIN

## 2023-10-04 SDOH — ECONOMIC STABILITY: HOUSING INSECURITY: IN THE LAST 12 MONTHS, HOW MANY PLACES HAVE YOU LIVED?: 1

## 2023-10-04 SDOH — SOCIAL STABILITY: SOCIAL NETWORK: HOW OFTEN DO YOU ATTEND CHURCH OR RELIGIOUS SERVICES?: MORE THAN 4 TIMES PER YEAR

## 2023-10-04 ASSESSMENT — COLUMBIA-SUICIDE SEVERITY RATING SCALE - C-SSRS
2. HAVE YOU ACTUALLY HAD ANY THOUGHTS OF KILLING YOURSELF?: NO
1. IN THE PAST MONTH, HAVE YOU WISHED YOU WERE DEAD OR WISHED YOU COULD GO TO SLEEP AND NOT WAKE UP?: NO
6. HAVE YOU EVER DONE ANYTHING, STARTED TO DO ANYTHING, OR PREPARED TO DO ANYTHING TO END YOUR LIFE?: NO

## 2023-10-04 ASSESSMENT — COGNITIVE AND FUNCTIONAL STATUS - GENERAL
DAILY ACTIVITIY SCORE: 24
DAILY ACTIVITIY SCORE: 24
MOBILITY SCORE: 24
PATIENT BASELINE BEDBOUND: NO
MOBILITY SCORE: 24

## 2023-10-04 ASSESSMENT — PATIENT HEALTH QUESTIONNAIRE - PHQ9
SUM OF ALL RESPONSES TO PHQ9 QUESTIONS 1 & 2: 0
2. FEELING DOWN, DEPRESSED OR HOPELESS: NOT AT ALL
1. LITTLE INTEREST OR PLEASURE IN DOING THINGS: NOT AT ALL

## 2023-10-04 ASSESSMENT — PAIN SCALES - PAIN ASSESSMENT IN ADVANCED DEMENTIA (PAINAD)
BODYLANGUAGE: RELAXED
BREATHING: NORMAL
CONSOLABILITY: NO NEED TO CONSOLE

## 2023-10-04 ASSESSMENT — ACTIVITIES OF DAILY LIVING (ADL)
DRESSING YOURSELF: INDEPENDENT
TOILETING: INDEPENDENT
ADEQUATE_TO_COMPLETE_ADL: NO
JUDGMENT_ADEQUATE_SAFELY_COMPLETE_DAILY_ACTIVITIES: YES
GROOMING: INDEPENDENT
HEARING - LEFT EAR: FUNCTIONAL
HEARING - RIGHT EAR: FUNCTIONAL
PATIENT'S MEMORY ADEQUATE TO SAFELY COMPLETE DAILY ACTIVITIES?: YES
BATHING: NEEDS ASSISTANCE
FEEDING YOURSELF: INDEPENDENT
WALKS IN HOME: INDEPENDENT

## 2023-10-04 ASSESSMENT — LIFESTYLE VARIABLES
SKIP TO QUESTIONS 9-10: 1
HOW MANY STANDARD DRINKS CONTAINING ALCOHOL DO YOU HAVE ON A TYPICAL DAY: PATIENT DOES NOT DRINK
AUDIT-C TOTAL SCORE: 0
AUDIT-C TOTAL SCORE: 0
SUBSTANCE_ABUSE_PAST_12_MONTHS: NO
AUDIT-C TOTAL SCORE: 0
AUDIT-C TOTAL SCORE: 0
HOW OFTEN DO YOU HAVE A DRINK CONTAINING ALCOHOL: NEVER
SKIP TO QUESTIONS 9-10: 1
SKIP TO QUESTIONS 9-10: 1
HOW OFTEN DO YOU HAVE 6 OR MORE DRINKS ON ONE OCCASION: NEVER
PRESCIPTION_ABUSE_PAST_12_MONTHS: NO

## 2023-10-04 ASSESSMENT — PAIN SCALES - GENERAL
PAINLEVEL_OUTOF10: 3
PAINLEVEL_OUTOF10: 7
PAINLEVEL_OUTOF10: 2
PAINLEVEL_OUTOF10: 7
PAINLEVEL_OUTOF10: 5 - MODERATE PAIN

## 2023-10-04 ASSESSMENT — PAIN SCALES - WONG BAKER: WONGBAKER_NUMERICALRESPONSE: HURTS LITTLE BIT

## 2023-10-04 ASSESSMENT — PAIN - FUNCTIONAL ASSESSMENT: PAIN_FUNCTIONAL_ASSESSMENT: 0-10

## 2023-10-04 NOTE — CARE PLAN
The patient's goals for the shift include SPO2 above 92    The clinical goals for the shift include SPO2 above 92    Problem: Pain  Goal: Takes deep breaths with improved pain control throughout the shift  Outcome: Progressing  Goal: Turns in bed with improved pain control throughout the shift  Outcome: Progressing  Goal: Walks with improved pain control throughout the shift  Outcome: Progressing  Goal: Performs ADL's with improved pain control throughout shift  Outcome: Progressing  Goal: Participates in PT with improved pain control throughout the shift  Outcome: Progressing  Goal: Free from opioid side effects throughout the shift  Outcome: Progressing  Goal: Free from acute confusion related to pain meds throughout the shift  Outcome: Progressing

## 2023-10-05 VITALS
SYSTOLIC BLOOD PRESSURE: 148 MMHG | DIASTOLIC BLOOD PRESSURE: 93 MMHG | RESPIRATION RATE: 16 BRPM | HEART RATE: 92 BPM | WEIGHT: 180 LBS | HEIGHT: 60 IN | OXYGEN SATURATION: 92 % | BODY MASS INDEX: 35.34 KG/M2 | TEMPERATURE: 97.5 F

## 2023-10-05 PROCEDURE — 99239 HOSP IP/OBS DSCHRG MGMT >30: CPT | Performed by: STUDENT IN AN ORGANIZED HEALTH CARE EDUCATION/TRAINING PROGRAM

## 2023-10-05 PROCEDURE — 2500000002 HC RX 250 W HCPCS SELF ADMINISTERED DRUGS (ALT 637 FOR MEDICARE OP, ALT 636 FOR OP/ED): Performed by: NURSE PRACTITIONER

## 2023-10-05 PROCEDURE — 2500000001 HC RX 250 WO HCPCS SELF ADMINISTERED DRUGS (ALT 637 FOR MEDICARE OP): Performed by: STUDENT IN AN ORGANIZED HEALTH CARE EDUCATION/TRAINING PROGRAM

## 2023-10-05 PROCEDURE — 2500000001 HC RX 250 WO HCPCS SELF ADMINISTERED DRUGS (ALT 637 FOR MEDICARE OP): Performed by: NURSE PRACTITIONER

## 2023-10-05 PROCEDURE — 2500000005 HC RX 250 GENERAL PHARMACY W/O HCPCS: Performed by: NURSE PRACTITIONER

## 2023-10-05 PROCEDURE — 94640 AIRWAY INHALATION TREATMENT: CPT

## 2023-10-05 PROCEDURE — G0378 HOSPITAL OBSERVATION PER HR: HCPCS

## 2023-10-05 PROCEDURE — 2500000004 HC RX 250 GENERAL PHARMACY W/ HCPCS (ALT 636 FOR OP/ED): Performed by: NURSE PRACTITIONER

## 2023-10-05 RX ORDER — PREDNISONE 20 MG/1
40 TABLET ORAL DAILY
Qty: 3 TABLET | Refills: 0 | Status: SHIPPED | OUTPATIENT
Start: 2023-10-06

## 2023-10-05 RX ORDER — DICLOFENAC SODIUM 10 MG/G
4 GEL TOPICAL 4 TIMES DAILY
Qty: 50 G | Refills: 1 | Status: SHIPPED | OUTPATIENT
Start: 2023-10-05

## 2023-10-05 RX ORDER — AZITHROMYCIN 500 MG/1
500 TABLET, FILM COATED ORAL
Qty: 3 TABLET | Refills: 0 | Status: SHIPPED | OUTPATIENT
Start: 2023-10-06

## 2023-10-05 RX ORDER — PANTOPRAZOLE SODIUM 40 MG/1
40 TABLET, DELAYED RELEASE ORAL
Qty: 30 TABLET | Refills: 0 | Status: SHIPPED | OUTPATIENT
Start: 2023-10-06

## 2023-10-05 RX ORDER — DULOXETIN HYDROCHLORIDE 60 MG/1
60 CAPSULE, DELAYED RELEASE ORAL DAILY
Qty: 30 CAPSULE | Refills: 1 | Status: SHIPPED | OUTPATIENT
Start: 2023-10-05 | End: 2024-01-10 | Stop reason: SDUPTHER

## 2023-10-05 RX ORDER — LIDOCAINE 560 MG/1
1 PATCH PERCUTANEOUS; TOPICAL; TRANSDERMAL DAILY
Qty: 14 PATCH | Refills: 0 | Status: SHIPPED | OUTPATIENT
Start: 2023-10-06

## 2023-10-05 RX ORDER — ALBUTEROL SULFATE 90 UG/1
2 AEROSOL, METERED RESPIRATORY (INHALATION) EVERY 6 HOURS PRN
Qty: 18 G | Refills: 11 | Status: SHIPPED | OUTPATIENT
Start: 2023-10-05

## 2023-10-05 RX ORDER — HYDROXYCHLOROQUINE SULFATE 200 MG/1
200 TABLET, FILM COATED ORAL 2 TIMES DAILY
Qty: 60 TABLET | Refills: 0 | Status: SHIPPED | OUTPATIENT
Start: 2023-10-05 | End: 2024-02-22 | Stop reason: SDUPTHER

## 2023-10-05 RX ORDER — HYDROCHLOROTHIAZIDE 25 MG/1
25 TABLET ORAL DAILY
Qty: 30 TABLET | Refills: 1 | Status: SHIPPED | OUTPATIENT
Start: 2023-10-05

## 2023-10-05 RX ORDER — LOSARTAN POTASSIUM 25 MG/1
25 TABLET ORAL DAILY
Qty: 30 TABLET | Refills: 1 | Status: SHIPPED | OUTPATIENT
Start: 2023-10-05

## 2023-10-05 RX ADMIN — DICLOFENAC SODIUM TOPICAL GEL, 1% 1 APPLICATION: 10 GEL TOPICAL at 08:32

## 2023-10-05 RX ADMIN — LOSARTAN POTASSIUM 25 MG: 25 TABLET, FILM COATED ORAL at 08:31

## 2023-10-05 RX ADMIN — DULOXETINE HYDROCHLORIDE 60 MG: 60 CAPSULE, DELAYED RELEASE ORAL at 08:31

## 2023-10-05 RX ADMIN — PREDNISONE 40 MG: 10 TABLET ORAL at 08:30

## 2023-10-05 RX ADMIN — ACETAMINOPHEN 650 MG: 325 TABLET, FILM COATED ORAL at 06:28

## 2023-10-05 RX ADMIN — HYDROXYCHLOROQUINE SULFATE 200 MG: 200 TABLET, FILM COATED ORAL at 08:30

## 2023-10-05 RX ADMIN — LIDOCAINE 1 PATCH: 4 PATCH TOPICAL at 08:31

## 2023-10-05 RX ADMIN — HYDROCHLOROTHIAZIDE 25 MG: 25 TABLET ORAL at 08:31

## 2023-10-05 RX ADMIN — PANTOPRAZOLE SODIUM 40 MG: 40 TABLET, DELAYED RELEASE ORAL at 08:31

## 2023-10-05 RX ADMIN — AZITHROMYCIN MONOHYDRATE 500 MG: 500 TABLET ORAL at 08:31

## 2023-10-05 RX ADMIN — IPRATROPIUM BROMIDE AND ALBUTEROL SULFATE 3 ML: .5; 3 SOLUTION RESPIRATORY (INHALATION) at 00:19

## 2023-10-05 RX ADMIN — IPRATROPIUM BROMIDE AND ALBUTEROL SULFATE 3 ML: .5; 3 SOLUTION RESPIRATORY (INHALATION) at 08:46

## 2023-10-05 RX ADMIN — Medication 1 TABLET: at 08:31

## 2023-10-05 ASSESSMENT — PAIN SCALES - PAIN ASSESSMENT IN ADVANCED DEMENTIA (PAINAD)
BODYLANGUAGE: RELAXED
BREATHING: NORMAL
BREATHING: NORMAL
CONSOLABILITY: NO NEED TO CONSOLE

## 2023-10-05 ASSESSMENT — COGNITIVE AND FUNCTIONAL STATUS - GENERAL
MOBILITY SCORE: 24
DAILY ACTIVITIY SCORE: 24

## 2023-10-05 ASSESSMENT — PAIN - FUNCTIONAL ASSESSMENT
PAIN_FUNCTIONAL_ASSESSMENT: 0-10
PAIN_FUNCTIONAL_ASSESSMENT: 0-10

## 2023-10-05 ASSESSMENT — PAIN SCALES - GENERAL
PAINLEVEL_OUTOF10: 0 - NO PAIN
PAINLEVEL_OUTOF10: 7
PAINLEVEL_OUTOF10: 0 - NO PAIN

## 2023-10-05 NOTE — NURSING NOTE
Discharge instructions reviewed with patient.  All questions answered.  PIV removed per policy.  Pt escort to the main entrance by wheelchair to family member personal car with all belongings.

## 2023-10-05 NOTE — CARE PLAN
Problem: Pain  Goal: Takes deep breaths with improved pain control throughout the shift  Outcome: Met  Goal: Turns in bed with improved pain control throughout the shift  Outcome: Met  Goal: Walks with improved pain control throughout the shift  Outcome: Met  Goal: Performs ADL's with improved pain control throughout shift  Outcome: Met  Goal: Participates in PT with improved pain control throughout the shift  Outcome: Met  Goal: Free from opioid side effects throughout the shift  Outcome: Met  Goal: Free from acute confusion related to pain meds throughout the shift  Outcome: Met     Problem: Fall/Injury  Goal: Not fall by end of shift  Outcome: Met  Goal: Be free from injury by end of the shift  Outcome: Met  Goal: Verbalize understanding of personal risk factors for fall in the hospital  Outcome: Met  Goal: Verbalize understanding of risk factor reduction measures to prevent injury from fall in the home  Outcome: Met   The patient's goals for the shift include SPO2 above 92    The clinical goals for the shift include Pt SPO2 will remain >92% throughout shift.

## 2023-10-05 NOTE — PROGRESS NOTES
10/05/23 1231   Discharge Planning   Living Arrangements Spouse/significant other   Support Systems Children;Family members   Assistance Needed N/A   Type of Residence Private residence   Do you have animals or pets at home? No   Who is requesting discharge planning? Patient   Home or Post Acute Services None   Patient expects to be discharged to: Home pending ambulating pulse ox to assess need for home oxygen.   Does the patient need discharge transport arranged? No  (Pt's dtr will provide.)     Assessment Note:  Met with pt and introduced myself as care coordinator and member of the Care Transitions team for discharge planning.  Pt's primary support  is provided by her family.  Pt's son was killed a couple of days ago from a GSW, plan to notify SW.   Pt feels safe at home.  Pt arranges for rides to drs appts.  Pt's address, phone number and contact information was verified.  Pt does not have any questions/concerns at this time.     Previous Home Care: none  DME: cane, rollator  Pharmacy:  [facility]    Transitional Care Coordination Progress Note:  Patient was discussed during interdisciplinary rounds.  Team members present: medical team, and TCC.  Plan per medical team: Pt admitted with COPD exacerbation. Plan to have bedside RN check an ambulating pulse ox.  Payer: [facility]  Status: Inpatient  Discharge disposition: Pt will discharge home with outpatient follow up.  Potential barriers: None  ADOD: 10/6  Care coordinator will continue to follow for discharge planning needs.     Funmilayo Tabares MSN, RN-BC  Transitional Care Coordinator (TCC)  [phone]

## 2023-10-05 NOTE — PROGRESS NOTES
Lisa Abreu is a 58 y.o. female on day 1 of admission presenting with Chronic obstructive pulmonary disease with acute exacerbation (CMS/HCC).    Subjective     The patient states she feels better today, however, wants to get home as her son has recently passed.       Objective     GENERAL APPEARANCE: A&Ox3, appears in no acute distress  HEAD: normocephalic, atraumatic  THROAT: Oral cavity and pharynx normal. No inflammation, swelling, exudate, or lesions.  NECK: Neck supple, non-tender without lymphadenopathy, masses or thyromegaly.  CARDIAC: Normal S1 and S2. No S3, S4 or murmurs. Rhythm is regular. There is no peripheral edema, cyanosis or pallor. Extremities are warm and well perfused. No carotid bruits.  LUNGS: Clear to auscultation bilaterally without rales, rhonchi, wheezing or diminished breath sounds.  ABDOMEN: Positive bowel sounds. Soft, nondistended, nontender. No guarding or rebound. No masses.  EXTREMITIES: No significant deformity or joint abnormality. No edema. Peripheral pulses intact. No varicosities.  SKIN: Skin normal color, texture and turgor with no lesions or rash  PSYCHIATRIC: oriented to person, place, and time, good judgement and reason, without hallucinations, abnormal affect or abnormal behaviors during the examination. Patient is not suicidal.        Last Recorded Vitals  Blood pressure (!) 155/99, pulse 105, temperature 36.7 °C (98.1 °F), resp. rate 16, height 1.524 m (5'), weight 81.6 kg (180 lb), SpO2 90 %.  Intake/Output last 3 Shifts:  No intake/output data recorded.    Relevant Results  Scheduled medications  azithromycin, 500 mg, oral, q24h ARLINE  diclofenac sodium, 4 g, Topical, 4x daily  DULoxetine, 60 mg, oral, Daily  fluticasone, 1 spray, Each Nostril, Daily  hydroCHLOROthiazide, 25 mg, oral, Daily  hydroxychloroquine, 200 mg, oral, BID  ipratropium-albuteroL, 3 mL, nebulization, q6h  lidocaine, 1 patch, transdermal, Daily  [Held by provider] loratadine, 10 mg, oral,  Daily  losartan, 25 mg, oral, Daily  multivitamin with minerals, 1 tablet, oral, Daily  pantoprazole, 40 mg, oral, Daily before breakfast  predniSONE, 40 mg, oral, Daily        Continuous medications  oxygen, , Last Rate: 2 mL/hr at 10/03/23 0610      PRN medications  PRN medications: acetaminophen **OR** acetaminophen **OR** acetaminophen, albuterol, gabapentin, LORazepam                         Assessment/Plan     Lisa Abreu is a 58 y.o. female with PMHx: Lupus fibromyalgia vertigo HTN, HLD, chronic knee pain presenting with complaint of shortness of breath states feels she cannot breathe get the oxygen in or out she also complains of a moist productive cough with clear mucus.  Upon arrival to the ED patient was noted to be hypoxic running high 80s low 90s was placed on 2 L nasal cannula.  Patient states ever since she had COVID a couple years ago she has been admitted and told she has been hypoxic although she is never really followed up outpatient for it she states she just drinks a lot of Gatorade to keep herself from being dehydrated.  She does however smoke about 5 or more cigarettes a day for the past 20 years.  Patient to be admitted acute exacerbation COPD.        Acute COPD  Hypoxia  - pulse ox 85-90 on room air--running about 95% on 2l  - will give prednisone 40mg daily  - will start on azithromycin 500mg daily  - continue with oxygen and eval for home needs  - duoneb q6 and albuterol prn     SLE  Chronic pain  - c/w plaquenil 200mg daily  - c/w gabapentin 300mg tid (Patient wants it to be prn while in hospital)     HTN  HLD  - c/w losartan 25mg daily  - c/w aspirin 81mg daily     Anxiety  Depression  Chronic pain  - c/w prn Ativan (OARRS checked)  - c/w duloxetine 60mg daily         Nutrition: Regular diet   GI prophylaxis: protonix prophylactic while on steroids  DVT prophylaxis: SCD  Code Status: Full Code  Disposition: Walking pulse ox test, possible discharge tomorrow +/- supplemental  O2    Nehemias Mcgowan MD     The patient encounter includes all but not limited to; Evaluation of laboratory results, pertinent imaging, and vital signs. Daily updates are discussed with any consulting services and family/medical power of  as needed. The patient's discharge  process begins at admission and daily contact with the patient's TCC and SW is pertinent in their efficient and safe discharge.

## 2023-10-06 NOTE — DISCHARGE SUMMARY
Discharge Diagnosis  Chronic obstructive pulmonary disease with acute exacerbation (CMS/[facility])    Issues Requiring Follow-Up  PCP establishment    Test Results Pending At Discharge  Pending Labs       No current pending labs.            Hospital Course   Lisa Abreu is a 58 y.o. female with PMHx: Lupus fibromyalgia vertigo HTN, HLD, chronic knee pain presenting with complaint of shortness of breath states feels she cannot breathe get the oxygen in or out she also complains of a moist productive cough with clear mucus.  Upon arrival to the ED patient was noted to be hypoxic running high 80s low 90s was placed on 2 L nasal cannula.  Patient states ever since she had COVID a couple years ago she has been admitted and told she has been hypoxic although she is never really followed up outpatient for it she states she just drinks a lot of Gatorade to keep herself from being dehydrated.  She does however smoke about 5 or more cigarettes a day for the past 20 years.  Patient to be admitted acute exacerbation COPD. Patient was weaned off O2 and underwent walking pulse ox which showed no lower O2 sat than 89%. She will be discharged with follow-up with a new PCP and further work-up for her pulmonary disease.    Pertinent Physical Exam At Time of Discharge  GENERAL APPEARANCE: A&Ox3, appears in no acute distress  HEAD: normocephalic, atraumatic  THROAT: Oral cavity and pharynx normal. No inflammation, swelling, exudate, or lesions.  NECK: Neck supple, non-tender without lymphadenopathy, masses or thyromegaly.  CARDIAC: Normal S1 and S2. No S3, S4 or murmurs. Rhythm is regular. There is no peripheral edema, cyanosis or pallor. Extremities are warm and well perfused. No carotid bruits.  LUNGS: Clear to auscultation bilaterally without rales, rhonchi, wheezing or diminished breath sounds.  ABDOMEN: Positive bowel sounds. Soft, nondistended, nontender. No guarding or rebound. No masses.  EXTREMITIES: No significant deformity  or joint abnormality. No edema. Peripheral pulses intact. No varicosities.  SKIN: Skin normal color, texture and turgor with no lesions or rash  PSYCHIATRIC: oriented to person, place, and time, good judgement and reason, without hallucinations, abnormal affect or abnormal behaviors during the examination. Patient is not suicidal.        Home Medications     Medication List      START taking these medications     albuterol 90 mcg/actuation inhaler; Inhale 2 puffs every 6 hours if   needed for wheezing.   azithromycin 500 mg tablet; Commonly known as: Zithromax; Take 1 tablet   (500 mg) by mouth once every 24 hours. Do not start before October 6, 2023.; Start taking on: October 6, 2023   pantoprazole 40 mg EC tablet; Commonly known as: ProtoNix; Take 1 tablet   (40 mg) by mouth once daily in the morning. Take before meals. Do not   crush, chew, or split. Do not start before October 6, 2023.; Start taking   on: October 6, 2023   predniSONE 20 mg tablet; Commonly known as: Deltasone; Take 2 tablets   (40 mg) by mouth once daily. Do not start before October 6, 2023.; Start   taking on: October 6, 2023     CHANGE how you take these medications     diclofenac sodium 1 % gel gel; Commonly known as: Voltaren; Apply 1   Application topically 4 times a day.; What changed: when to take this,   reasons to take this     CONTINUE taking these medications     cetirizine 10 mg capsule; Commonly known as: ZyrTEC; Take 1 capsule (10   mg) by mouth once daily.   DULoxetine 60 mg DR capsule; Commonly known as: Cymbalta; Take 1 capsule   (60 mg) by mouth once daily.   fluticasone 50 mcg/actuation nasal spray; Commonly known as: Flonase   gabapentin 300 mg capsule; Commonly known as: Neurontin   hydroCHLOROthiazide 25 mg tablet; Commonly known as: HYDRODiuril; Take 1   tablet (25 mg) by mouth once daily.   hydroxychloroquine 200 mg tablet; Commonly known as: Plaquenil; Take 1   tablet (200 mg) by mouth 2 times a day.   lidocaine 4 %  patch; Place 1 patch over 12 hours on the skin once daily.   Remove & discard patch within 12 hours or as directed by MD. Do not start   before October 6, 2023.; Start taking on: October 6, 2023   LORazepam 0.5 mg tablet; Commonly known as: Ativan   losartan 25 mg tablet; Commonly known as: Cozaar; Take 1 tablet (25 mg)   by mouth once daily.   meclizine 25 mg tablet; Commonly known as: Antivert   multivitamin capsule       Outpatient Follow-Up  Future Appointments   Date Time Provider Department Center   2/22/2024 10:00 AM Migel Cuenca MD [id number]       Mat Mcgowan MD

## 2023-10-12 PROCEDURE — 96372 THER/PROPH/DIAG INJ SC/IM: CPT

## 2023-10-12 PROCEDURE — 99283 EMERGENCY DEPT VISIT LOW MDM: CPT | Mod: 25 | Performed by: EMERGENCY MEDICINE

## 2023-10-12 ASSESSMENT — COLUMBIA-SUICIDE SEVERITY RATING SCALE - C-SSRS
6. HAVE YOU EVER DONE ANYTHING, STARTED TO DO ANYTHING, OR PREPARED TO DO ANYTHING TO END YOUR LIFE?: NO
1. IN THE PAST MONTH, HAVE YOU WISHED YOU WERE DEAD OR WISHED YOU COULD GO TO SLEEP AND NOT WAKE UP?: NO
2. HAVE YOU ACTUALLY HAD ANY THOUGHTS OF KILLING YOURSELF?: NO

## 2023-10-12 ASSESSMENT — PAIN SCALES - GENERAL: PAINLEVEL_OUTOF10: 3

## 2023-10-12 ASSESSMENT — PAIN - FUNCTIONAL ASSESSMENT: PAIN_FUNCTIONAL_ASSESSMENT: 0-10

## 2023-10-13 ENCOUNTER — HOSPITAL ENCOUNTER (EMERGENCY)
Facility: HOSPITAL | Age: 58
Discharge: HOME | End: 2023-10-13
Attending: EMERGENCY MEDICINE
Payer: COMMERCIAL

## 2023-10-13 ENCOUNTER — HOSPITAL ENCOUNTER (OUTPATIENT)
Dept: CARDIOLOGY | Facility: HOSPITAL | Age: 58
Discharge: HOME | End: 2023-10-13
Payer: COMMERCIAL

## 2023-10-13 ENCOUNTER — APPOINTMENT (OUTPATIENT)
Dept: RADIOLOGY | Facility: HOSPITAL | Age: 58
End: 2023-10-13
Payer: COMMERCIAL

## 2023-10-13 VITALS
HEART RATE: 108 BPM | HEIGHT: 64 IN | RESPIRATION RATE: 20 BRPM | DIASTOLIC BLOOD PRESSURE: 82 MMHG | TEMPERATURE: 98.1 F | WEIGHT: 220.46 LBS | SYSTOLIC BLOOD PRESSURE: 121 MMHG | OXYGEN SATURATION: 92 % | BODY MASS INDEX: 37.64 KG/M2

## 2023-10-13 DIAGNOSIS — R06.02 SHORTNESS OF BREATH: Primary | ICD-10-CM

## 2023-10-13 LAB
ALBUMIN SERPL BCP-MCNC: 3.5 G/DL (ref 3.4–5)
ALP SERPL-CCNC: 96 U/L (ref 33–110)
ALT SERPL W P-5'-P-CCNC: 15 U/L (ref 7–45)
ANION GAP BLDV CALCULATED.4IONS-SCNC: 5 MMOL/L (ref 10–25)
ANION GAP SERPL CALC-SCNC: 14 MMOL/L (ref 10–20)
AST SERPL W P-5'-P-CCNC: 22 U/L (ref 9–39)
ATRIAL RATE: 111 BPM
BASE EXCESS BLDV CALC-SCNC: 8.2 MMOL/L (ref -2–3)
BASOPHILS # BLD AUTO: 0.04 X10*3/UL (ref 0–0.1)
BASOPHILS NFR BLD AUTO: 0.4 %
BILIRUB SERPL-MCNC: 0.5 MG/DL (ref 0–1.2)
BNP SERPL-MCNC: 8 PG/ML (ref 0–99)
BODY TEMPERATURE: 37 DEGREES CELSIUS
BUN SERPL-MCNC: 8 MG/DL (ref 6–23)
CA-I BLDV-SCNC: 1.19 MMOL/L (ref 1.1–1.33)
CALCIUM SERPL-MCNC: 9.6 MG/DL (ref 8.6–10.6)
CARDIAC TROPONIN I PNL SERPL HS: 3 NG/L (ref 0–34)
CHLORIDE BLDV-SCNC: 95 MMOL/L (ref 98–107)
CHLORIDE SERPL-SCNC: 92 MMOL/L (ref 98–107)
CO2 SERPL-SCNC: 30 MMOL/L (ref 21–32)
CREAT SERPL-MCNC: 0.66 MG/DL (ref 0.5–1.05)
EOSINOPHIL # BLD AUTO: 0.08 X10*3/UL (ref 0–0.7)
EOSINOPHIL NFR BLD AUTO: 0.9 %
ERYTHROCYTE [DISTWIDTH] IN BLOOD BY AUTOMATED COUNT: 13.4 % (ref 11.5–14.5)
GFR SERPL CREATININE-BSD FRML MDRD: >90 ML/MIN/1.73M*2
GLUCOSE BLDV-MCNC: 149 MG/DL (ref 74–99)
GLUCOSE SERPL-MCNC: 135 MG/DL (ref 74–99)
HCO3 BLDV-SCNC: 34.4 MMOL/L (ref 22–26)
HCT VFR BLD AUTO: 37.8 % (ref 36–46)
HCT VFR BLD EST: 42 % (ref 36–46)
HGB BLD-MCNC: 13.2 G/DL (ref 12–16)
HGB BLDV-MCNC: 14 G/DL (ref 12–16)
IMM GRANULOCYTES # BLD AUTO: 0.06 X10*3/UL (ref 0–0.7)
IMM GRANULOCYTES NFR BLD AUTO: 0.7 % (ref 0–0.9)
LACTATE BLDV-SCNC: 0.9 MMOL/L (ref 0.4–2)
LYMPHOCYTES # BLD AUTO: 1.14 X10*3/UL (ref 1.2–4.8)
LYMPHOCYTES NFR BLD AUTO: 12.7 %
MCH RBC QN AUTO: 28.2 PG (ref 26–34)
MCHC RBC AUTO-ENTMCNC: 34.9 G/DL (ref 32–36)
MCV RBC AUTO: 81 FL (ref 80–100)
MONOCYTES # BLD AUTO: 0.97 X10*3/UL (ref 0.1–1)
MONOCYTES NFR BLD AUTO: 10.8 %
NEUTROPHILS # BLD AUTO: 6.68 X10*3/UL (ref 1.2–7.7)
NEUTROPHILS NFR BLD AUTO: 74.5 %
NRBC BLD-RTO: 0 /100 WBCS (ref 0–0)
OXYHGB MFR BLDV: 54.8 % (ref 45–75)
P AXIS: 61 DEGREES
P OFFSET: 193 MS
P ONSET: 151 MS
PCO2 BLDV: 53 MM HG (ref 41–51)
PH BLDV: 7.42 PH (ref 7.33–7.43)
PLATELET # BLD AUTO: 542 X10*3/UL (ref 150–450)
PMV BLD AUTO: 8.8 FL (ref 7.5–11.5)
PO2 BLDV: 37 MM HG (ref 35–45)
POTASSIUM BLDV-SCNC: 3.5 MMOL/L (ref 3.5–5.3)
POTASSIUM SERPL-SCNC: 3.4 MMOL/L (ref 3.5–5.3)
PR INTERVAL: 134 MS
PROT SERPL-MCNC: 9.1 G/DL (ref 6.4–8.2)
Q ONSET: 218 MS
QRS COUNT: 18 BEATS
QRS DURATION: 70 MS
QT INTERVAL: 338 MS
QTC CALCULATION(BAZETT): 459 MS
QTC FREDERICIA: 415 MS
R AXIS: 12 DEGREES
RBC # BLD AUTO: 4.68 X10*6/UL (ref 4–5.2)
SAO2 % BLDV: 58 % (ref 45–75)
SODIUM BLDV-SCNC: 131 MMOL/L (ref 136–145)
SODIUM SERPL-SCNC: 133 MMOL/L (ref 136–145)
T AXIS: 66 DEGREES
T OFFSET: 387 MS
VENTRICULAR RATE: 111 BPM
WBC # BLD AUTO: 9 X10*3/UL (ref 4.4–11.3)

## 2023-10-13 PROCEDURE — 93005 ELECTROCARDIOGRAM TRACING: CPT

## 2023-10-13 PROCEDURE — 85025 COMPLETE CBC W/AUTO DIFF WBC: CPT | Mod: CMCLAB | Performed by: EMERGENCY MEDICINE

## 2023-10-13 PROCEDURE — 94640 AIRWAY INHALATION TREATMENT: CPT

## 2023-10-13 PROCEDURE — 71046 X-RAY EXAM CHEST 2 VIEWS: CPT

## 2023-10-13 PROCEDURE — 2500000002 HC RX 250 W HCPCS SELF ADMINISTERED DRUGS (ALT 637 FOR MEDICARE OP, ALT 636 FOR OP/ED): Mod: SE | Performed by: EMERGENCY MEDICINE

## 2023-10-13 PROCEDURE — 82435 ASSAY OF BLOOD CHLORIDE: CPT | Mod: 59,CMCLAB | Performed by: EMERGENCY MEDICINE

## 2023-10-13 PROCEDURE — 83880 ASSAY OF NATRIURETIC PEPTIDE: CPT | Performed by: EMERGENCY MEDICINE

## 2023-10-13 PROCEDURE — 84484 ASSAY OF TROPONIN QUANT: CPT | Mod: CMCLAB | Performed by: EMERGENCY MEDICINE

## 2023-10-13 PROCEDURE — 71046 X-RAY EXAM CHEST 2 VIEWS: CPT | Performed by: RADIOLOGY

## 2023-10-13 PROCEDURE — 36415 COLL VENOUS BLD VENIPUNCTURE: CPT | Mod: CMCLAB | Performed by: EMERGENCY MEDICINE

## 2023-10-13 PROCEDURE — 82435 ASSAY OF BLOOD CHLORIDE: CPT | Mod: CMCLAB

## 2023-10-13 PROCEDURE — 2500000004 HC RX 250 GENERAL PHARMACY W/ HCPCS (ALT 636 FOR OP/ED): Mod: SE

## 2023-10-13 RX ORDER — KETOROLAC TROMETHAMINE 15 MG/ML
INJECTION, SOLUTION INTRAMUSCULAR; INTRAVENOUS
Status: COMPLETED
Start: 2023-10-13 | End: 2023-10-13

## 2023-10-13 RX ORDER — IPRATROPIUM BROMIDE AND ALBUTEROL SULFATE 2.5; .5 MG/3ML; MG/3ML
3 SOLUTION RESPIRATORY (INHALATION)
Status: DISCONTINUED | OUTPATIENT
Start: 2023-10-13 | End: 2023-10-13 | Stop reason: HOSPADM

## 2023-10-13 RX ORDER — KETOROLAC TROMETHAMINE 15 MG/ML
15 INJECTION, SOLUTION INTRAMUSCULAR; INTRAVENOUS ONCE
Status: COMPLETED | OUTPATIENT
Start: 2023-10-13 | End: 2023-10-13

## 2023-10-13 RX ADMIN — IPRATROPIUM BROMIDE AND ALBUTEROL SULFATE 3 ML: .5; 3 SOLUTION RESPIRATORY (INHALATION) at 01:46

## 2023-10-13 RX ADMIN — KETOROLAC TROMETHAMINE 15 MG: 15 INJECTION, SOLUTION INTRAMUSCULAR; INTRAVENOUS at 01:49

## 2023-10-13 ASSESSMENT — PAIN SCALES - GENERAL
PAINLEVEL_OUTOF10: 7
PAINLEVEL_OUTOF10: 0 - NO PAIN
PAINLEVEL_OUTOF10: 0 - NO PAIN

## 2023-10-13 ASSESSMENT — LIFESTYLE VARIABLES
REASON UNABLE TO ASSESS: NO
EVER FELT BAD OR GUILTY ABOUT YOUR DRINKING: NO
EVER HAD A DRINK FIRST THING IN THE MORNING TO STEADY YOUR NERVES TO GET RID OF A HANGOVER: NO
HAVE YOU EVER FELT YOU SHOULD CUT DOWN ON YOUR DRINKING: NO
HAVE PEOPLE ANNOYED YOU BY CRITICIZING YOUR DRINKING: NO

## 2023-10-13 ASSESSMENT — ENCOUNTER SYMPTOMS
DIARRHEA: 0
CHEST TIGHTNESS: 1
FATIGUE: 1
FEVER: 0
VOMITING: 0
NAUSEA: 0
HEADACHES: 0
CHILLS: 1
COUGH: 1
SHORTNESS OF BREATH: 1

## 2023-10-13 NOTE — ED TRIAGE NOTES
Patient recently admitted for shortness of breath / asthma in CDU discharged  with medications states that she has been having increased SOB since discharged. Patient is also having chest pressure since Friday that worsens with cough.

## 2023-10-13 NOTE — ED PROVIDER NOTES
Limitations to History: None  External Records Reviewed: 10/5/22 discharge summary   Independent Historians: Patient, Family     HPI  Lisa Abreu is a 58 y.o. female with a history of lupus, HTN, COPD presents today with continued SOB. She was recently discharged from a hospital admission for a COPD exacerbation on 10/5. Since discharge she has had continued SOB, cough, and chest tightness. She is using her albuterol inhaler 2-3 times a day. She is not on oxygen at home, although she feels she needs it. She wakes up at night due to her SOB and cough. She feels very fatigued and tired. She has also had chills without any documented fevers. She denies any n/v/d, unilateral LE swelling, hemoptysis, chest pain.     PMH  Past Medical History:   Diagnosis Date    COPD (chronic obstructive pulmonary disease) (CMS/[facility])     Cramping of hands 10/03/2023    Endometrial polyp 10/03/2023    Exertional chest pain 10/03/2023    HLD (hyperlipidemia)     Hypertension     Injury of right rotator cuff 10/03/2023    Knee pain, bilateral 10/03/2023    Localized osteoarthritis of knees, bilateral 10/03/2023    Nuclear sclerosis of both eyes 10/03/2023    Obesity 10/03/2023    Persistent insomnia 03/01/2010    Post-menopausal bleeding 10/03/2023    Prediabetes 10/03/2023    Pruritus 09/29/2021    Sciatica 10/03/2023    Shortness of breath on exertion 10/03/2023    SLE (systemic lupus erythematosus) (CMS/[facility])     Social phobia 03/01/2010    Thyroid fullness 10/03/2023    Undifferentiated connective tissue disease (CMS/[facility]) 10/03/2023    Vertigo 10/03/2023     Meds  Current Outpatient Medications   Medication Instructions    albuterol 90 mcg/actuation inhaler 2 puffs, inhalation, Every 6 hours PRN    azithromycin (ZITHROMAX) 500 mg, oral, Every 24 hours scheduled    cetirizine (ZYRTEC) 10 mg, oral, Daily    diclofenac sodium (Voltaren) 1 % gel gel 1 Application, Topical, 4 times daily    DULoxetine (CYMBALTA) 60 mg, oral, Daily     "fluticasone (Flonase) 50 mcg/actuation nasal spray 1 spray, Each Nostril, Daily    gabapentin (NEURONTIN) 300 mg, oral, 3 times daily    hydroCHLOROthiazide (HYDRODIURIL) 25 mg, oral, Daily    hydroxychloroquine (PLAQUENIL) 200 mg, oral, 2 times daily    lidocaine 4 % patch 1 patch, transdermal, Daily, Remove & discard patch within 12 hours or as directed by MD.    LORazepam (ATIVAN) 0.5 mg, oral, Every 8 hours PRN    losartan (COZAAR) 25 mg, oral, Daily    meclizine (ANTIVERT) 25 mg, oral, Every 8 hours    multivitamin capsule 1 tablet, oral, Daily    pantoprazole (PROTONIX) 40 mg, oral, Daily before breakfast, Do not crush, chew, or split.    predniSONE (DELTASONE) 40 mg, oral, Daily     Allergies  No Known Allergies   SHx  Social History     Tobacco Use    Smoking status: Every Day     Types: Cigarettes    Smokeless tobacco: Never   Vaping Use    Vaping Use: Never used   Substance Use Topics    Alcohol use: Never    Drug use: Never     ROS  Review of Systems   Constitutional:  Positive for chills and fatigue. Negative for fever.   Respiratory:  Positive for cough, chest tightness and shortness of breath.    Cardiovascular:  Negative for chest pain and leg swelling.   Gastrointestinal:  Negative for diarrhea, nausea and vomiting.   Neurological:  Negative for headaches.       ------------------------------------------------------------------------------------------------------------------------------------------    /82   Pulse 108   Temp 36.7 °C (98.1 °F) (Tympanic)   Resp 20   Ht 1.626 m (5' 4\")   Wt 100 kg (220 lb 7.4 oz)   SpO2 92%   BMI 37.84 kg/m²     Physical Exam  Constitutional:       General: She is not in acute distress.  HENT:      Head: Normocephalic and atraumatic.   Eyes:      Extraocular Movements: Extraocular movements intact.      Pupils: Pupils are equal, round, and reactive to light.   Cardiovascular:      Rate and Rhythm: Normal rate and regular rhythm.   Pulmonary:      Effort: " Prolonged expiration present.      Breath sounds: Decreased breath sounds present.   Musculoskeletal:      Right lower leg: No edema.      Left lower leg: No edema.   Skin:     General: Skin is warm and dry.   Neurological:      General: No focal deficit present.          ------------------------------------------------------------------------------------------------------------------------------------------  Diagnoses as of 10/13/23 0333   Shortness of breath      Medications   ipratropium-albuteroL (Duo-Neb) 0.5-2.5 mg/3 mL nebulizer solution 3 mL (3 mL nebulization Given 10/13/23 0146)   ketorolac (Toradol) injection 15 mg (15 mg intramuscular Given 10/13/23 0149)        Medical Decision Making:   Lisa Abreu is a 58 y.o. female with a history of lupus, HTN, COPD presents today with continued SOB. She was recently discharged from a hospital admission for a COPD exacerbation on 10/5. Since discharge she has had continued chest tightness, sob, and cough. She uses her albuterol multiple times a day.  She is tachycardic, afebrile, and sating 92%. She is currently on 2L O2. She has diminished breath sounds throughout.     CBC shows a normal WBC at 9 and her chest xray shows no pneumonia making an infectious cause less likely. She was negative on viral swabs on 10/3. PE was considered due to recent hospitalization however her improvement of symptoms after duoneb treatment make PE less likely. Troponin was normal making a cardiogenic cause of symptoms such as myocarditis.    She was given toradol for pain and a duo-neb. On reevaluation she is feeling much better with improvement of her diminished breath sounds. Symptoms, physical exam, and workup are most consistent with COPD exacerbation. Recommended the patient stay in CDU to be set up with home oxygen and a breathing treatment machine. However due to circumstances she would like to go home.   Plan to discharge home with a spacer to improve albuterol delivery  to lungs and follow up outpatient with PCP and pulmonology.     Discussed with: Attending         Dinora Goldman  10/13/23 2326

## 2023-10-23 ENCOUNTER — TELEPHONE (OUTPATIENT)
Dept: OTHER | Age: 58
End: 2023-10-23
Payer: COMMERCIAL

## 2023-10-23 NOTE — TELEPHONE ENCOUNTER
Caller: pt    Tried offering an appointment but pt declined and insists on call back to bring you up to date on recent hospitalization.

## 2024-01-03 ENCOUNTER — APPOINTMENT (OUTPATIENT)
Dept: BEHAVIORAL HEALTH | Facility: CLINIC | Age: 59
End: 2024-01-03
Payer: COMMERCIAL

## 2024-01-10 ENCOUNTER — TELEMEDICINE (OUTPATIENT)
Dept: BEHAVIORAL HEALTH | Facility: CLINIC | Age: 59
End: 2024-01-10
Payer: COMMERCIAL

## 2024-01-10 DIAGNOSIS — F33.1 MODERATE EPISODE OF RECURRENT MAJOR DEPRESSIVE DISORDER (MULTI): ICD-10-CM

## 2024-01-10 DIAGNOSIS — F41.1 GAD (GENERALIZED ANXIETY DISORDER): ICD-10-CM

## 2024-01-10 DIAGNOSIS — M54.16 LUMBAR RADICULOPATHY: ICD-10-CM

## 2024-01-10 DIAGNOSIS — F41.0 PANIC ATTACK: ICD-10-CM

## 2024-01-10 PROCEDURE — 90833 PSYTX W PT W E/M 30 MIN: CPT | Performed by: PSYCHIATRY & NEUROLOGY

## 2024-01-10 PROCEDURE — 99214 OFFICE O/P EST MOD 30 MIN: CPT | Performed by: PSYCHIATRY & NEUROLOGY

## 2024-01-10 RX ORDER — LORAZEPAM 0.5 MG/1
0.5 TABLET ORAL EVERY 8 HOURS PRN
Qty: 15 TABLET | Refills: 0 | Status: SHIPPED | OUTPATIENT
Start: 2024-01-10 | End: 2024-03-13 | Stop reason: SDUPTHER

## 2024-01-10 RX ORDER — DULOXETIN HYDROCHLORIDE 60 MG/1
60 CAPSULE, DELAYED RELEASE ORAL DAILY
Qty: 30 CAPSULE | Refills: 2 | Status: SHIPPED | OUTPATIENT
Start: 2024-01-10 | End: 2024-03-13 | Stop reason: SDUPTHER
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
## 2024-01-30 DIAGNOSIS — M54.16 LUMBAR RADICULOPATHY: ICD-10-CM

## 2024-02-07 ENCOUNTER — TELEMEDICINE (OUTPATIENT)
Dept: BEHAVIORAL HEALTH | Facility: CLINIC | Age: 59
End: 2024-02-07
Payer: COMMERCIAL

## 2024-02-07 DIAGNOSIS — F41.1 GAD (GENERALIZED ANXIETY DISORDER): ICD-10-CM

## 2024-02-07 DIAGNOSIS — F33.1 MODERATE EPISODE OF RECURRENT MAJOR DEPRESSIVE DISORDER (MULTI): ICD-10-CM

## 2024-02-07 DIAGNOSIS — F41.0 PANIC ATTACK: ICD-10-CM

## 2024-02-07 PROCEDURE — 99214 OFFICE O/P EST MOD 30 MIN: CPT | Performed by: PSYCHIATRY & NEUROLOGY

## 2024-02-07 PROCEDURE — 90833 PSYTX W PT W E/M 30 MIN: CPT | Performed by: PSYCHIATRY & NEUROLOGY
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
## 2024-02-22 ENCOUNTER — OFFICE VISIT (OUTPATIENT)
Dept: RHEUMATOLOGY | Facility: CLINIC | Age: 59
End: 2024-02-22
Payer: COMMERCIAL

## 2024-02-22 ENCOUNTER — LAB (OUTPATIENT)
Dept: LAB | Facility: LAB | Age: 59
End: 2024-02-22
Payer: COMMERCIAL

## 2024-02-22 VITALS
SYSTOLIC BLOOD PRESSURE: 148 MMHG | HEART RATE: 94 BPM | WEIGHT: 193 LBS | HEIGHT: 60 IN | BODY MASS INDEX: 37.89 KG/M2 | DIASTOLIC BLOOD PRESSURE: 79 MMHG

## 2024-02-22 DIAGNOSIS — M54.50 LOW BACK PAIN, UNSPECIFIED BACK PAIN LATERALITY, UNSPECIFIED CHRONICITY, UNSPECIFIED WHETHER SCIATICA PRESENT: ICD-10-CM

## 2024-02-22 DIAGNOSIS — M32.9 SYSTEMIC LUPUS ERYTHEMATOSUS, UNSPECIFIED SLE TYPE, UNSPECIFIED ORGAN INVOLVEMENT STATUS (MULTI): ICD-10-CM

## 2024-02-22 DIAGNOSIS — M32.9 SYSTEMIC LUPUS ERYTHEMATOSUS, UNSPECIFIED SLE TYPE, UNSPECIFIED ORGAN INVOLVEMENT STATUS (MULTI): Primary | ICD-10-CM

## 2024-02-22 DIAGNOSIS — M32.15: ICD-10-CM

## 2024-02-22 LAB
ALBUMIN SERPL BCP-MCNC: 3.6 G/DL (ref 3.4–5)
ALP SERPL-CCNC: 102 U/L (ref 33–110)
ALT SERPL W P-5'-P-CCNC: 10 U/L (ref 7–45)
ANION GAP SERPL CALC-SCNC: 12 MMOL/L (ref 10–20)
AST SERPL W P-5'-P-CCNC: 13 U/L (ref 9–39)
BASOPHILS # BLD AUTO: 0.04 X10*3/UL (ref 0–0.1)
BASOPHILS NFR BLD AUTO: 0.9 %
BILIRUB SERPL-MCNC: 0.3 MG/DL (ref 0–1.2)
BUN SERPL-MCNC: 12 MG/DL (ref 6–23)
C3 SERPL-MCNC: 122 MG/DL (ref 87–200)
C4 SERPL-MCNC: 32 MG/DL (ref 10–50)
CALCIUM SERPL-MCNC: 9.4 MG/DL (ref 8.6–10.6)
CHLORIDE SERPL-SCNC: 102 MMOL/L (ref 98–107)
CO2 SERPL-SCNC: 30 MMOL/L (ref 21–32)
CREAT SERPL-MCNC: 0.79 MG/DL (ref 0.5–1.05)
CRP SERPL-MCNC: 0.78 MG/DL
DSDNA AB SER-ACNC: 116 IU/ML
EGFRCR SERPLBLD CKD-EPI 2021: 86 ML/MIN/1.73M*2
EOSINOPHIL # BLD AUTO: 0.07 X10*3/UL (ref 0–0.7)
EOSINOPHIL NFR BLD AUTO: 1.5 %
ERYTHROCYTE [DISTWIDTH] IN BLOOD BY AUTOMATED COUNT: 13.8 % (ref 11.5–14.5)
ERYTHROCYTE [SEDIMENTATION RATE] IN BLOOD BY WESTERGREN METHOD: 31 MM/H (ref 0–30)
GLUCOSE SERPL-MCNC: 100 MG/DL (ref 74–99)
HCT VFR BLD AUTO: 41.4 % (ref 36–46)
HGB BLD-MCNC: 14.2 G/DL (ref 12–16)
IMM GRANULOCYTES # BLD AUTO: 0 X10*3/UL (ref 0–0.7)
IMM GRANULOCYTES NFR BLD AUTO: 0 % (ref 0–0.9)
LYMPHOCYTES # BLD AUTO: 1.59 X10*3/UL (ref 1.2–4.8)
LYMPHOCYTES NFR BLD AUTO: 34.3 %
MCH RBC QN AUTO: 28.5 PG (ref 26–34)
MCHC RBC AUTO-ENTMCNC: 34.3 G/DL (ref 32–36)
MCV RBC AUTO: 83 FL (ref 80–100)
MONOCYTES # BLD AUTO: 0.49 X10*3/UL (ref 0.1–1)
MONOCYTES NFR BLD AUTO: 10.6 %
NEUTROPHILS # BLD AUTO: 2.44 X10*3/UL (ref 1.2–7.7)
NEUTROPHILS NFR BLD AUTO: 52.7 %
NRBC BLD-RTO: 0 /100 WBCS (ref 0–0)
PLATELET # BLD AUTO: 404 X10*3/UL (ref 150–450)
POTASSIUM SERPL-SCNC: 3.6 MMOL/L (ref 3.5–5.3)
PROT SERPL-MCNC: 7.5 G/DL (ref 6.4–8.2)
RBC # BLD AUTO: 4.98 X10*6/UL (ref 4–5.2)
SODIUM SERPL-SCNC: 140 MMOL/L (ref 136–145)
WBC # BLD AUTO: 4.6 X10*3/UL (ref 4.4–11.3)

## 2024-02-22 PROCEDURE — 85652 RBC SED RATE AUTOMATED: CPT

## 2024-02-22 PROCEDURE — 86160 COMPLEMENT ANTIGEN: CPT

## 2024-02-22 PROCEDURE — 36415 COLL VENOUS BLD VENIPUNCTURE: CPT

## 2024-02-22 PROCEDURE — 3077F SYST BP >= 140 MM HG: CPT | Performed by: INTERNAL MEDICINE

## 2024-02-22 PROCEDURE — 86140 C-REACTIVE PROTEIN: CPT

## 2024-02-22 PROCEDURE — 80053 COMPREHEN METABOLIC PANEL: CPT

## 2024-02-22 PROCEDURE — 86225 DNA ANTIBODY NATIVE: CPT

## 2024-02-22 PROCEDURE — 85025 COMPLETE CBC W/AUTO DIFF WBC: CPT

## 2024-02-22 PROCEDURE — 3078F DIAST BP <80 MM HG: CPT | Performed by: INTERNAL MEDICINE

## 2024-02-22 PROCEDURE — 99214 OFFICE O/P EST MOD 30 MIN: CPT | Performed by: INTERNAL MEDICINE

## 2024-02-22 RX ORDER — HYDROXYCHLOROQUINE SULFATE 200 MG/1
200 TABLET, FILM COATED ORAL 2 TIMES DAILY
Qty: 360 TABLET | Refills: 0 | Status: SHIPPED | OUTPATIENT
Start: 2024-02-22 | End: 2024-08-20

## 2024-02-22 ASSESSMENT — PAIN SCALES - GENERAL: PAINLEVEL: 6

## 2024-02-22 NOTE — PROGRESS NOTES
Subjective   Patient ID: Lisa Abreu is a 58 y.o. female who presents for FUV.    HPI  This is a 58-year-old female with a PMH of SLE (SHAHEED of 1:1280, +Aguilar, +SSA, & +RNP), inflammatory arthritis), +RF fibromyalgia, & osteoarthritis presents for follow-up appointment.      Current meds:  HCQ 200mg bid   Duloxetine 60 mg daily  Gabapentin 100mg daily - rx by pain management     02/2023:  She c/o pain in her b/l shoulders,and b/l knee. She used to f/u with physical therapy. But last few weeks she couldn't due to grand kids issues. She has morning stiffness that lasts for few mins. She denies any rashes, oral or nasal ulcers or alopecia. But says she still has Raynaud's phenomena, dry eyes and dry mouth. She hasn't seen an ophthalmologist for about 1.5 yrs now, but denies any side effects to Plaquenil.      She had an xray of L shoulder which showed degenerative changes. She has right shoulder pain and MRI right shoulder had shown small full thickness rotator cuff tear (supra and infra spinatus)     08/2023: Today, she feels extremely tired, exhausted. She reports generalized body, muscle pain.  But, she does not have any other active lupus findings, she denies oral ulcer, skin rashes, joint pain.     ROS  Joint pain in hands: negative  Joint swelling: negative  Morning stiffness and duration: negative   strength: normal  Oral ulcer: negative  Genital ulcer: negative  Raynaud phenomenon: negative  Chest pain/dyspnea: negative  Low back pain: negative  Visual problem: negative  Dry eyes/dry mouth: negative  Skin rash/scaling/psoriasis: negative       Objective     PEXAM  VS reviewed, WNL  General: Alert, no distress   HEENT: Normocephalic/atraumatic, No alopecia. PERRLA. Sclera white, conjunctiva pink, no malar rash. no oral or nasal ulcer. Oral cavity pink and moist, no erythema or exudate, dentition good.   Neck: supple  Respiratory: CTA B, no adventitious breath sounds  Cardiac: RRR, no murmurs, carotid,  or bruits  Abdominal: symmetrical, soft, non-tender, non-distended, normoactive BSx4 quadrants, no CVA tenderness or suprapubic tenderness  MSK: Joints of upper and lower extremities were assessed for synovitis and ROM.    +multiple tender muscle points, no active synovitis  Extremities: no clubbing, no cyanosis, no edema  Skin: Skin warm and moist.   Neuro: non-focal, Strength 5/5 throughout. Normal gait. No cerebellar pathologic exam     Assessment/Plan      This is a 58-year-old female with a PMH of SLE (SHAHEED of 1:1280, +Aguilar, +SSA, & +RNP, inflammatory arthritis ), + RF, fibromyalgia, & knee osteoarthritis presents for follow-up appointment.      Her x-ray b/l knee suggested DJD and MRI-right shoulder in 2018 showed tear in her supra spinatus and infra spinatus.      Last HCQ eye exam 04/2021 no toxicity.     #SLE - in remission  -Will continue HCQ 200mg bid - refills given  -Provided a referral to f/u with opthalmology   -repeat labs ordered - cbc, cmp, ESR, CRP, ds-dna, urine protein/creatinine     #fibromyalgia  recommended her regular exercise  -will continue Duloxetine     #osteoarthritis  -Follow up with psych and pain management and physical therapy  -A referral was given to orthopedic surgery for right shoulder rotator cuff tear  -Advised her to do exercises at home to strengthen her quadriceps such as ankle weights and ball exercises  -A disability placard was provided     RTC 6 months

## 2024-02-23 ENCOUNTER — TELEPHONE (OUTPATIENT)
Dept: RHEUMATOLOGY | Facility: CLINIC | Age: 59
End: 2024-02-23
Payer: COMMERCIAL

## 2024-02-23 NOTE — TELEPHONE ENCOUNTER
Call placed to patient and this nurse was unable to establish direct contact. VM left making aware that per provider, Dr. Cuenca, all inflammatory markers are normal with her dsDNA, lupus marker is elevated, but that she trends that way. She will continue Plaquenil twice daily and will see us back in 6 months. Encouraged to call/ message with any questions/ concerns should they arise.

## 2024-03-13 ENCOUNTER — TELEMEDICINE (OUTPATIENT)
Dept: BEHAVIORAL HEALTH | Facility: CLINIC | Age: 59
End: 2024-03-13
Payer: COMMERCIAL

## 2024-03-13 DIAGNOSIS — F41.1 GAD (GENERALIZED ANXIETY DISORDER): ICD-10-CM

## 2024-03-13 DIAGNOSIS — M54.16 LUMBAR RADICULOPATHY: ICD-10-CM

## 2024-03-13 DIAGNOSIS — F33.1 MODERATE EPISODE OF RECURRENT MAJOR DEPRESSIVE DISORDER (MULTI): ICD-10-CM

## 2024-03-13 DIAGNOSIS — F41.0 PANIC ATTACK: ICD-10-CM

## 2024-03-13 PROCEDURE — 99214 OFFICE O/P EST MOD 30 MIN: CPT | Performed by: PSYCHIATRY & NEUROLOGY

## 2024-03-13 PROCEDURE — 90833 PSYTX W PT W E/M 30 MIN: CPT | Performed by: PSYCHIATRY & NEUROLOGY

## 2024-03-13 RX ORDER — DULOXETIN HYDROCHLORIDE 60 MG/1
60 CAPSULE, DELAYED RELEASE ORAL DAILY
Qty: 30 CAPSULE | Refills: 1 | OUTPATIENT
Start: 2024-03-13

## 2024-03-13 RX ORDER — DULOXETIN HYDROCHLORIDE 60 MG/1
60 CAPSULE, DELAYED RELEASE ORAL DAILY
Qty: 30 CAPSULE | Refills: 2 | Status: SHIPPED | OUTPATIENT
Start: 2024-03-13 | End: 2024-05-08 | Stop reason: SDUPTHER

## 2024-03-13 RX ORDER — LORAZEPAM 0.5 MG/1
0.5 TABLET ORAL DAILY PRN
Qty: 15 TABLET | Refills: 0 | Status: SHIPPED | OUTPATIENT
Start: 2024-03-13 | End: 2024-05-08 | Stop reason: SDUPTHER
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
## 2024-04-01 ENCOUNTER — TELEPHONE (OUTPATIENT)
Dept: BEHAVIORAL HEALTH | Facility: CLINIC | Age: 59
End: 2024-04-01
Payer: COMMERCIAL

## 2024-04-01 NOTE — LETTER
April 1, 2024     Patient: Lisa Abreu   YOB: 1965   Date of Visit: 4/1/2024       To Whom it May Concern,     Lisa Abreu is a patient currently in my care for Depression and Anxiety. She has increased anxiety related to claustrophobia and would benefit from a 2 bedroom apartment to avoid exacerbating her symptoms. Thank you for your attention to this metter       Sincerely,     Nereida Turner MD        Letter written per request to be mailed to the patient

## 2024-04-10 ENCOUNTER — TELEPHONE (OUTPATIENT)
Dept: RHEUMATOLOGY | Facility: CLINIC | Age: 59
End: 2024-04-10

## 2024-04-10 DIAGNOSIS — M32.9 SYSTEMIC LUPUS ERYTHEMATOSUS, UNSPECIFIED SLE TYPE, UNSPECIFIED ORGAN INVOLVEMENT STATUS (MULTI): Primary | ICD-10-CM

## 2024-04-10 NOTE — TELEPHONE ENCOUNTER
Patient states she called three weeks ago and that she requested a handicap placard script to be mailed to her. She stated that she still has not received it. Wants script for 5 years. Please call her at [phone].

## 2024-05-08 ENCOUNTER — TELEMEDICINE (OUTPATIENT)
Dept: BEHAVIORAL HEALTH | Facility: CLINIC | Age: 59
End: 2024-05-08
Payer: COMMERCIAL

## 2024-05-08 DIAGNOSIS — F41.0 PANIC ATTACK: ICD-10-CM

## 2024-05-08 DIAGNOSIS — M54.16 LUMBAR RADICULOPATHY: ICD-10-CM

## 2024-05-08 DIAGNOSIS — F33.1 MODERATE EPISODE OF RECURRENT MAJOR DEPRESSIVE DISORDER (MULTI): ICD-10-CM

## 2024-05-08 PROCEDURE — 99214 OFFICE O/P EST MOD 30 MIN: CPT | Performed by: PSYCHIATRY & NEUROLOGY

## 2024-05-08 RX ORDER — DULOXETIN HYDROCHLORIDE 60 MG/1
60 CAPSULE, DELAYED RELEASE ORAL DAILY
Qty: 30 CAPSULE | Refills: 2 | Status: SHIPPED | OUTPATIENT
Start: 2024-05-08

## 2024-05-08 RX ORDER — LORAZEPAM 0.5 MG/1
0.5 TABLET ORAL DAILY PRN
Qty: 15 TABLET | Refills: 0 | Status: SHIPPED | OUTPATIENT
Start: 2024-05-08 | End: 2024-06-07
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
## 2024-06-03 DIAGNOSIS — M32.9 SYSTEMIC LUPUS ERYTHEMATOSUS, UNSPECIFIED SLE TYPE, UNSPECIFIED ORGAN INVOLVEMENT STATUS (MULTI): Primary | ICD-10-CM

## 2024-06-10 RX ORDER — LOSARTAN POTASSIUM 25 MG/1
1 TABLET ORAL DAILY
COMMUNITY
Start: 2018-10-11

## 2024-06-10 RX ORDER — CETIRIZINE HYDROCHLORIDE 10 MG/1
10 TABLET ORAL DAILY
COMMUNITY
Start: 2023-10-31

## 2024-06-10 RX ORDER — COVID-19 ANTIGEN TEST
KIT MISCELLANEOUS
COMMUNITY
Start: 2023-10-28

## 2024-06-17 RX ORDER — GABAPENTIN 300 MG/1
300 CAPSULE ORAL 3 TIMES DAILY
Qty: 90 CAPSULE | Refills: 2 | Status: SHIPPED | OUTPATIENT
Start: 2024-06-17

## 2024-06-18 PROBLEM — G47.00 INSOMNIA, UNSPECIFIED: Status: ACTIVE | Noted: 2024-06-18

## 2024-06-18 PROBLEM — R21 RASH AND OTHER NONSPECIFIC SKIN ERUPTION: Status: ACTIVE | Noted: 2024-06-18

## 2024-06-19 ENCOUNTER — APPOINTMENT (OUTPATIENT)
Dept: BEHAVIORAL HEALTH | Facility: CLINIC | Age: 59
End: 2024-06-19
Payer: COMMERCIAL

## 2024-06-19 DIAGNOSIS — F41.0 PANIC ATTACK: ICD-10-CM

## 2024-06-19 DIAGNOSIS — F33.1 MODERATE EPISODE OF RECURRENT MAJOR DEPRESSIVE DISORDER (MULTI): ICD-10-CM

## 2024-06-19 DIAGNOSIS — F41.1 GAD (GENERALIZED ANXIETY DISORDER): ICD-10-CM

## 2024-06-19 DIAGNOSIS — M54.16 LUMBAR RADICULOPATHY: ICD-10-CM

## 2024-06-19 PROCEDURE — 90833 PSYTX W PT W E/M 30 MIN: CPT | Performed by: PSYCHIATRY & NEUROLOGY

## 2024-06-19 PROCEDURE — 99214 OFFICE O/P EST MOD 30 MIN: CPT | Performed by: PSYCHIATRY & NEUROLOGY

## 2024-06-19 RX ORDER — DULOXETIN HYDROCHLORIDE 60 MG/1
60 CAPSULE, DELAYED RELEASE ORAL DAILY
Qty: 30 CAPSULE | Refills: 5 | Status: SHIPPED | OUTPATIENT
Start: 2024-06-19

## 2024-06-19 RX ORDER — LORAZEPAM 0.5 MG/1
0.5 TABLET ORAL DAILY PRN
Qty: 15 TABLET | Refills: 0 | Status: SHIPPED | OUTPATIENT
Start: 2024-06-19 | End: 2024-07-19
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
## 2024-07-02 PROBLEM — E66.9 OBESITY (BMI 30-39.9): Status: ACTIVE | Noted: 2024-07-02

## 2024-07-02 PROBLEM — L29.9 PRURITUS: Status: ACTIVE | Noted: 2024-07-02

## 2024-07-02 PROBLEM — N84.0 POLYP OF CORPUS UTERI: Status: ACTIVE | Noted: 2024-07-02

## 2024-07-02 PROBLEM — M79.7 FIBROMYALGIA: Status: ACTIVE | Noted: 2024-07-02

## 2024-07-02 PROBLEM — R05.9 COUGH: Status: ACTIVE | Noted: 2024-07-02

## 2024-07-02 PROBLEM — R59.9 ADENOPATHY: Status: ACTIVE | Noted: 2023-10-03

## 2024-07-02 PROBLEM — J30.2 SEASONAL ALLERGIES: Status: ACTIVE | Noted: 2023-10-03

## 2024-07-02 PROBLEM — J44.1 ACUTE EXACERBATION OF CHRONIC OBSTRUCTIVE PULMONARY DISEASE (MULTI): Status: ACTIVE | Noted: 2023-10-03

## 2024-07-02 PROBLEM — M21.6X1 OTHER ACQUIRED DEFORMITIES OF RIGHT FOOT: Status: ACTIVE | Noted: 2024-07-02

## 2024-07-02 PROBLEM — R53.81 PHYSICAL DECONDITIONING: Status: ACTIVE | Noted: 2024-07-02

## 2024-07-02 PROBLEM — M79.671 PAIN IN RIGHT FOOT: Status: ACTIVE | Noted: 2024-07-02

## 2024-07-02 PROBLEM — R01.1 UNDIAGNOSED CARDIAC MURMURS: Status: ACTIVE | Noted: 2024-07-02

## 2024-07-02 PROBLEM — J40 BRONCHITIS: Status: ACTIVE | Noted: 2024-07-02

## 2024-07-02 PROBLEM — R51.9 HEADACHE: Status: ACTIVE | Noted: 2024-07-02

## 2024-07-02 PROBLEM — G89.29 CHRONIC BACK PAIN: Status: ACTIVE | Noted: 2024-07-02
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
## 2024-07-02 PROBLEM — H18.413 ARCUS SENILIS OF BOTH EYES: Status: ACTIVE | Noted: 2023-10-03

## 2024-07-02 PROBLEM — I10 ESSENTIAL (PRIMARY) HYPERTENSION: Status: ACTIVE | Noted: 2024-07-02

## 2024-07-02 PROBLEM — L84 CORNS AND CALLOSITIES: Status: ACTIVE | Noted: 2024-07-02

## 2024-07-02 PROBLEM — M79.601 CHRONIC PAIN OF RIGHT UPPER EXTREMITY: Status: ACTIVE | Noted: 2024-07-02

## 2024-07-02 PROBLEM — M54.12 CERVICAL RADICULOPATHY: Status: ACTIVE | Noted: 2024-07-02

## 2024-07-02 PROBLEM — M72.2 PLANTAR FASCIITIS: Status: ACTIVE | Noted: 2024-07-02

## 2024-07-02 PROBLEM — M54.50 LOW BACK PAIN, UNSPECIFIED: Status: ACTIVE | Noted: 2023-10-03

## 2024-07-02 PROBLEM — Z72.0 TOBACCO ABUSE: Status: ACTIVE | Noted: 2024-07-02

## 2024-07-02 PROBLEM — M54.9 CHRONIC BACK PAIN: Status: ACTIVE | Noted: 2024-07-02

## 2024-07-02 PROBLEM — L85.3 ASTEATOSIS CUTIS: Status: ACTIVE | Noted: 2021-09-29

## 2024-07-02 PROBLEM — R09.02 HYPOXIA: Status: ACTIVE | Noted: 2024-07-02

## 2024-07-02 PROBLEM — R06.02 SHORTNESS OF BREATH: Status: ACTIVE | Noted: 2023-10-13

## 2024-07-02 PROBLEM — G89.29 CHRONIC PAIN OF RIGHT UPPER EXTREMITY: Status: ACTIVE | Noted: 2024-07-02

## 2024-07-02 PROBLEM — E78.5 HYPERLIPIDEMIA: Status: ACTIVE | Noted: 2023-10-03

## 2024-07-02 PROBLEM — S46.009A INJURY OF TENDON OF ROTATOR CUFF: Status: ACTIVE | Noted: 2024-07-02

## 2024-07-02 PROBLEM — J96.00 ACUTE RESPIRATORY FAILURE (MULTI): Status: ACTIVE | Noted: 2023-10-03

## 2024-08-22 ENCOUNTER — LAB (OUTPATIENT)
Dept: LAB | Facility: LAB | Age: 59
End: 2024-08-22
Payer: COMMERCIAL

## 2024-08-22 ENCOUNTER — APPOINTMENT (OUTPATIENT)
Dept: ENDOCRINOLOGY | Facility: CLINIC | Age: 59
End: 2024-08-22
Payer: COMMERCIAL

## 2024-08-22 ENCOUNTER — HOSPITAL ENCOUNTER (OUTPATIENT)
Dept: RADIOLOGY | Facility: HOSPITAL | Age: 59
Discharge: HOME | End: 2024-08-22
Payer: COMMERCIAL

## 2024-08-22 ENCOUNTER — APPOINTMENT (OUTPATIENT)
Dept: RHEUMATOLOGY | Facility: CLINIC | Age: 59
End: 2024-08-22
Payer: COMMERCIAL

## 2024-08-22 VITALS
SYSTOLIC BLOOD PRESSURE: 108 MMHG | HEIGHT: 60 IN | BODY MASS INDEX: 37.97 KG/M2 | DIASTOLIC BLOOD PRESSURE: 76 MMHG | WEIGHT: 193.4 LBS | HEART RATE: 94 BPM

## 2024-08-22 DIAGNOSIS — M79.7 FIBROMYALGIA: ICD-10-CM

## 2024-08-22 DIAGNOSIS — M32.9 SYSTEMIC LUPUS ERYTHEMATOSUS, UNSPECIFIED SLE TYPE, UNSPECIFIED ORGAN INVOLVEMENT STATUS (MULTI): ICD-10-CM

## 2024-08-22 DIAGNOSIS — M32.15: ICD-10-CM

## 2024-08-22 DIAGNOSIS — M32.9 SYSTEMIC LUPUS ERYTHEMATOSUS, UNSPECIFIED SLE TYPE, UNSPECIFIED ORGAN INVOLVEMENT STATUS (MULTI): Primary | ICD-10-CM

## 2024-08-22 DIAGNOSIS — M19.90 OSTEOARTHRITIS, UNSPECIFIED OSTEOARTHRITIS TYPE, UNSPECIFIED SITE: ICD-10-CM

## 2024-08-22 LAB
ALBUMIN SERPL BCP-MCNC: 3.6 G/DL (ref 3.4–5)
ALP SERPL-CCNC: 114 U/L (ref 33–136)
ALT SERPL W P-5'-P-CCNC: 8 U/L (ref 7–45)
ANION GAP SERPL CALC-SCNC: 13 MMOL/L (ref 10–20)
AST SERPL W P-5'-P-CCNC: 13 U/L (ref 9–39)
BASOPHILS # BLD AUTO: 0.04 X10*3/UL (ref 0–0.1)
BASOPHILS NFR BLD AUTO: 0.9 %
BILIRUB SERPL-MCNC: 0.3 MG/DL (ref 0–1.2)
BUN SERPL-MCNC: 14 MG/DL (ref 6–23)
C3 SERPL-MCNC: 139 MG/DL (ref 87–200)
C4 SERPL-MCNC: 35 MG/DL (ref 10–50)
CALCIUM SERPL-MCNC: 9.8 MG/DL (ref 8.6–10.6)
CHLORIDE SERPL-SCNC: 97 MMOL/L (ref 98–107)
CO2 SERPL-SCNC: 32 MMOL/L (ref 21–32)
CREAT SERPL-MCNC: 0.68 MG/DL (ref 0.5–1.05)
CRP SERPL-MCNC: 1.24 MG/DL
DSDNA AB SER-ACNC: 136 IU/ML
EGFRCR SERPLBLD CKD-EPI 2021: >90 ML/MIN/1.73M*2
EOSINOPHIL # BLD AUTO: 0.05 X10*3/UL (ref 0–0.7)
EOSINOPHIL NFR BLD AUTO: 1.1 %
ERYTHROCYTE [DISTWIDTH] IN BLOOD BY AUTOMATED COUNT: 13.6 % (ref 11.5–14.5)
ERYTHROCYTE [SEDIMENTATION RATE] IN BLOOD BY WESTERGREN METHOD: 35 MM/H (ref 0–30)
GLUCOSE SERPL-MCNC: 95 MG/DL (ref 74–99)
HCT VFR BLD AUTO: 42.4 % (ref 36–46)
HGB BLD-MCNC: 14.2 G/DL (ref 12–16)
IMM GRANULOCYTES # BLD AUTO: 0.01 X10*3/UL (ref 0–0.7)
IMM GRANULOCYTES NFR BLD AUTO: 0.2 % (ref 0–0.9)
LYMPHOCYTES # BLD AUTO: 1.04 X10*3/UL (ref 1.2–4.8)
LYMPHOCYTES NFR BLD AUTO: 23.4 %
MCH RBC QN AUTO: 28.4 PG (ref 26–34)
MCHC RBC AUTO-ENTMCNC: 33.5 G/DL (ref 32–36)
MCV RBC AUTO: 85 FL (ref 80–100)
MONOCYTES # BLD AUTO: 0.42 X10*3/UL (ref 0.1–1)
MONOCYTES NFR BLD AUTO: 9.4 %
NEUTROPHILS # BLD AUTO: 2.89 X10*3/UL (ref 1.2–7.7)
NEUTROPHILS NFR BLD AUTO: 65 %
NRBC BLD-RTO: 0 /100 WBCS (ref 0–0)
PLATELET # BLD AUTO: 427 X10*3/UL (ref 150–450)
POTASSIUM SERPL-SCNC: 3.7 MMOL/L (ref 3.5–5.3)
PROT SERPL-MCNC: 7.9 G/DL (ref 6.4–8.2)
RBC # BLD AUTO: 5 X10*6/UL (ref 4–5.2)
SODIUM SERPL-SCNC: 138 MMOL/L (ref 136–145)
WBC # BLD AUTO: 4.5 X10*3/UL (ref 4.4–11.3)

## 2024-08-22 PROCEDURE — 86140 C-REACTIVE PROTEIN: CPT

## 2024-08-22 PROCEDURE — 99214 OFFICE O/P EST MOD 30 MIN: CPT | Performed by: INTERNAL MEDICINE

## 2024-08-22 PROCEDURE — 86225 DNA ANTIBODY NATIVE: CPT

## 2024-08-22 PROCEDURE — 86160 COMPLEMENT ANTIGEN: CPT

## 2024-08-22 PROCEDURE — 72110 X-RAY EXAM L-2 SPINE 4/>VWS: CPT

## 2024-08-22 PROCEDURE — 72170 X-RAY EXAM OF PELVIS: CPT

## 2024-08-22 PROCEDURE — 3074F SYST BP LT 130 MM HG: CPT | Performed by: INTERNAL MEDICINE

## 2024-08-22 PROCEDURE — 3078F DIAST BP <80 MM HG: CPT | Performed by: INTERNAL MEDICINE

## 2024-08-22 PROCEDURE — 80053 COMPREHEN METABOLIC PANEL: CPT

## 2024-08-22 PROCEDURE — 85025 COMPLETE CBC W/AUTO DIFF WBC: CPT

## 2024-08-22 PROCEDURE — 85652 RBC SED RATE AUTOMATED: CPT

## 2024-08-22 PROCEDURE — 3008F BODY MASS INDEX DOCD: CPT | Performed by: INTERNAL MEDICINE

## 2024-08-22 RX ORDER — HYDROXYCHLOROQUINE SULFATE 200 MG/1
200 TABLET, FILM COATED ORAL 2 TIMES DAILY
Qty: 360 TABLET | Refills: 0 | Status: SHIPPED | OUTPATIENT
Start: 2024-08-22 | End: 2025-02-18

## 2024-08-22 NOTE — PROGRESS NOTES
"Reason for Visit:   Patient is participating in a research study as per specific study detail below.   Study visit time point: Screening/baseline.   Study short name: CHIP-LUPUS  Type of Visit:   Outpatient.   -----------------------------------   This note is created in accordance with Standard Operating Procedures for Research Studies at [facility] which can be found on the intranet at: [email or web address]            PROTOCOL TITLE: \"Asymptomatic Coronary Artery Disease and Clonal Hematopoiesis of Indeterminate Potential (CHIP) in Patients with Systemic Lupus Erythematosus\" (CHIP-LUPUS)  :  Name: Migel Cuenca MD  The following was discussed with the patient:  Purpose of the study  Patient rights and responsibilities  Study Procedures  Risks, benefits, and alternatives to study participation  Study financials including cost to patient  Confidentiality and privacy  Injury language  All questions were answered by the coordinator and Dr. Migel Cuenca. Patient verbalized understanding of all the information given to her. Patient signed the consent, as copy of the document was given to the patient for her records.   Standard of care procedures and assessments were performed by Dr. Migel Cuenca. Study related information was documented in source document. Labs for standard of care ordered.     Patient left the clinic without assistance.     JC BENNETT RN    "

## 2024-08-22 NOTE — PROGRESS NOTES
Subjective   Patient ID: Lisa Abreu is a 59 y.o. female who presents for Follow-up (Pain/joints).    HPI  This is a 59-year-old female with a PMH of SLE (SHAHEED of 1:1280, +Aguilar, +SSA, & +RNP), inflammatory arthritis), +RF fibromyalgia, & osteoarthritis presents for follow-up appointment.      Current meds:  HCQ 200mg bid   Duloxetine 60 mg daily  Gabapentin 100mg daily - rx by pain management     02/2023:  She c/o pain in her b/l shoulders,and b/l knee. She used to f/u with physical therapy. But last few weeks she couldn't due to grand kids issues. She has morning stiffness that lasts for few mins. She denies any rashes, oral or nasal ulcers or alopecia. But says she still has Raynaud's phenomena, dry eyes and dry mouth. She hasn't seen an ophthalmologist for about 1.5 yrs now, but denies any side effects to Plaquenil.      She had an xray of L shoulder which showed degenerative changes. She has right shoulder pain and MRI right shoulder had shown small full thickness rotator cuff tear (supra and infra spinatus)     08/2023: Today, she feels extremely tired, exhausted. She reports generalized body, muscle pain.  But, she does not have any other active lupus findings, she denies oral ulcer, skin rashes, joint pain.     08/24:  She reports joint pain in her shoulders, knees and lower back  Also, she is having intermittent joint pain in her hands  Also, she has also intermittent rash, hives like lesions.  She endorses some photosensitivity, but not so much    Current med:   mg daily, no recent eye exam    ROS  Joint pain in hands: negative   Joint swelling: negative  Morning stiffness and duration: negative   strength: normal  Oral ulcer: negative  Genital ulcer: negative  Raynaud phenomenon: negative  Chest pain/dyspnea: negative  Low back pain: negative  Visual problem: negative  Dry eyes/dry mouth: negative  Skin rash/scaling/psoriasis: negative       Objective     PEXAM  VS reviewed, WNL  General:  Alert, no distress   HEENT: Normocephalic/atraumatic, No alopecia. PERRLA. Sclera white, conjunctiva pink, no malar rash. no oral or nasal ulcer. Oral cavity pink and moist, no erythema or exudate, dentition good.   Neck: supple  Respiratory: CTA B, no adventitious breath sounds  Cardiac: RRR, no murmurs, carotid, or bruits  Abdominal: symmetrical, soft, non-tender, non-distended, normoactive BSx4 quadrants, no CVA tenderness or suprapubic tenderness  MSK: Joints of upper and lower extremities were assessed for synovitis and ROM.    Today she has no evidence of synovitis in the joints of her hands or wrists, tender joint count 0, swollen joint count 0   Extremities: no clubbing, no cyanosis, no edema  Skin: Skin warm and moist.   Neuro: non-focal, Strength 5/5 throughout. Normal gait. No cerebellar pathologic exam     Assessment/Plan   This is a 59-year-old female with a PMH of SLE (SHAHEED of 1:1280, +Aguilar, +SSA, & +RNP, inflammatory arthritis ), + RF, fibromyalgia, & knee osteoarthritis presents for follow-up appointment.      Her x-ray b/l knee suggested DJD and MRI-right shoulder in 2018 showed tear in her supra spinatus and infra spinatus.      Last HCQ eye exam 04/2021 no toxicity.     #SLE - in remission  -Will continue HCQ 200mg bid - refills given  -Provided a referral to f/u with opthalmology   -repeat labs ordered - cbc, cmp, ESR, CRP, ds-dna, urine protein/creatinine     #fibromyalgia  recommended her regular exercise  -will continue Duloxetine     #osteoarthritis  -will see her hip and knee x-rays  -requested a scooter, will make a research about it  -PT referral    RTC 6 months

## 2024-08-26 DIAGNOSIS — F41.0 PANIC ATTACK: ICD-10-CM

## 2024-08-26 DIAGNOSIS — M32.9 SYSTEMIC LUPUS ERYTHEMATOSUS, UNSPECIFIED SLE TYPE, UNSPECIFIED ORGAN INVOLVEMENT STATUS (MULTI): ICD-10-CM

## 2024-08-26 PROBLEM — R25.2 SPASM: Status: ACTIVE | Noted: 2024-08-26

## 2024-08-26 PROBLEM — M35.9 UNDIFFERENTIATED CONNECTIVE TISSUE DISEASE (MULTI): Status: ACTIVE | Noted: 2024-08-26

## 2024-08-26 PROBLEM — D22.9 MELANOCYTIC NEVUS: Status: ACTIVE | Noted: 2024-08-26

## 2024-08-26 PROBLEM — D25.9 LEIOMYOMA OF UTERUS, UNSPECIFIED: Status: ACTIVE | Noted: 2024-08-26

## 2024-08-26 PROBLEM — R25.2 CRAMPING OF HANDS: Status: ACTIVE | Noted: 2024-08-26

## 2024-08-26 PROBLEM — M19.90 ARTHRITIS: Status: ACTIVE | Noted: 2024-08-26

## 2024-08-26 PROBLEM — M21.612 BUNION OF LEFT FOOT: Status: ACTIVE | Noted: 2024-08-26

## 2024-08-26 PROBLEM — R06.09 DYSPNEA ON EXERTION: Status: ACTIVE | Noted: 2024-08-26

## 2024-08-26 PROBLEM — L25.9 CONTACT DERMATITIS: Status: ACTIVE | Noted: 2017-10-09

## 2024-08-26 PROBLEM — H10.9 CONJUNCTIVITIS: Status: ACTIVE | Noted: 2024-08-26

## 2024-08-26 PROBLEM — J32.9 CHRONIC SINUSITIS, UNSPECIFIED: Status: ACTIVE | Noted: 2024-08-26

## 2024-08-26 PROBLEM — L65.8 OTHER SPECIFIED NONSCARRING HAIR LOSS: Status: ACTIVE | Noted: 2019-10-03

## 2024-08-26 PROBLEM — M25.569 KNEE PAIN: Status: ACTIVE | Noted: 2024-08-26

## 2024-08-26 PROBLEM — H25.10 NUCLEAR SCLEROSIS: Status: ACTIVE | Noted: 2024-08-26

## 2024-08-26 PROBLEM — N84.0 POLYP OF ENDOMETRIUM: Status: ACTIVE | Noted: 2024-08-26

## 2024-08-26 PROBLEM — L65.9 NONSCARRING HAIR LOSS, UNSPECIFIED: Status: ACTIVE | Noted: 2017-10-09

## 2024-08-26 PROBLEM — S46.001A INJURY OF RIGHT ROTATOR CUFF: Status: ACTIVE | Noted: 2024-08-26

## 2024-08-26 PROBLEM — J06.9 ACUTE UPPER RESPIRATORY INFECTION, UNSPECIFIED: Status: ACTIVE | Noted: 2024-08-26

## 2024-08-26 PROBLEM — A63.0 ANOGENITAL (VENEREAL) WARTS: Status: ACTIVE | Noted: 2024-08-26

## 2024-08-26 PROBLEM — R11.0 NAUSEA: Status: ACTIVE | Noted: 2024-08-26

## 2024-08-26 PROBLEM — H60.509 ACUTE OTITIS EXTERNA: Status: ACTIVE | Noted: 2024-08-26

## 2024-08-26 PROBLEM — N92.1 METRORRHAGIA: Status: ACTIVE | Noted: 2024-08-26

## 2024-08-26 PROBLEM — F17.210 NICOTINE DEPENDENCE, CIGARETTES, UNCOMPLICATED: Status: ACTIVE | Noted: 2024-08-26

## 2024-08-26 PROBLEM — L50.9 HIVES: Status: ACTIVE | Noted: 2024-08-26

## 2024-08-26 PROBLEM — L82.1 OTHER SEBORRHEIC KERATOSIS: Status: ACTIVE | Noted: 2017-11-26

## 2024-08-26 PROBLEM — Z01.20 ENCOUNTER FOR DENTAL EXAMINATION: Status: ACTIVE | Noted: 2024-08-26

## 2024-08-26 PROBLEM — N84.1 POLYP OF CERVIX UTERI: Status: ACTIVE | Noted: 2024-08-26

## 2024-08-26 PROBLEM — M21.611 BUNION OF RIGHT FOOT: Status: ACTIVE | Noted: 2024-08-26

## 2024-08-26 PROBLEM — Z79.899 LONG-TERM USE OF PLAQUENIL: Status: ACTIVE | Noted: 2024-08-26

## 2024-08-26 PROBLEM — N39.0 URINARY TRACT INFECTION: Status: ACTIVE | Noted: 2024-08-26

## 2024-08-26 PROBLEM — Z00.00 ROUTINE GENERAL MEDICAL EXAMINATION AT A HEALTH CARE FACILITY: Status: ACTIVE | Noted: 2024-08-26

## 2024-08-26 PROBLEM — R07.9 CHEST PAIN ON EXERTION: Status: ACTIVE | Noted: 2024-08-26

## 2024-08-26 PROBLEM — R13.10 DYSPHAGIA, UNSPECIFIED: Status: ACTIVE | Noted: 2024-08-26

## 2024-08-26 PROBLEM — R00.2 PALPITATIONS: Status: ACTIVE | Noted: 2024-08-26

## 2024-08-26 PROBLEM — D21.9 FIBROIDS: Status: ACTIVE | Noted: 2024-08-26

## 2024-08-26 PROBLEM — N89.8 LEUKORRHEA: Status: ACTIVE | Noted: 2024-08-26

## 2024-08-26 PROBLEM — R42 DIZZINESS: Status: ACTIVE | Noted: 2024-08-26

## 2024-08-26 PROBLEM — G89.4 CHRONIC PAIN SYNDROME: Status: ACTIVE | Noted: 2024-08-26

## 2024-08-26 PROBLEM — Z28.20: Status: ACTIVE | Noted: 2024-08-26

## 2024-08-26 PROBLEM — N95.0 POSTMENOPAUSAL BLEEDING: Status: ACTIVE | Noted: 2024-08-26

## 2024-08-26 PROBLEM — J44.9 CHRONIC OBSTRUCTIVE PULMONARY DISEASE, UNSPECIFIED (MULTI): Status: ACTIVE | Noted: 2024-08-26

## 2024-08-26 PROBLEM — R73.03 PREDIABETES: Status: ACTIVE | Noted: 2024-08-26

## 2024-08-26 PROBLEM — M79.609 PAIN IN LIMB: Status: ACTIVE | Noted: 2024-08-26

## 2024-08-26 PROBLEM — Z87.891 PERSONAL HISTORY OF TOBACCO USE: Status: ACTIVE | Noted: 2024-08-26

## 2024-08-26 PROBLEM — M54.30 SCIATICA: Status: ACTIVE | Noted: 2024-08-26

## 2024-08-26 PROBLEM — E66.9 OBESITY: Status: ACTIVE | Noted: 2024-08-26

## 2024-08-26 PROBLEM — G93.32 CHRONIC FATIGUE SYNDROME: Status: ACTIVE | Noted: 2024-08-26

## 2024-08-26 PROBLEM — M25.569 PAIN IN JOINT INVOLVING LOWER LEG: Status: ACTIVE | Noted: 2024-08-26

## 2024-08-26 PROBLEM — R80.9 PROTEINURIA: Status: ACTIVE | Noted: 2024-08-26

## 2024-08-26 PROBLEM — H81.10 BPV (BENIGN POSITIONAL VERTIGO): Status: ACTIVE | Noted: 2024-08-26

## 2024-08-26 PROBLEM — B35.1 TINEA UNGUIUM: Status: ACTIVE | Noted: 2024-08-26

## 2024-08-26 RX ORDER — LORAZEPAM 0.5 MG/1
0.5 TABLET ORAL DAILY PRN
Qty: 15 TABLET | Refills: 0 | Status: SHIPPED | OUTPATIENT
Start: 2024-08-26 | End: 2024-09-25

## 2024-08-28 ENCOUNTER — TELEPHONE (OUTPATIENT)
Dept: RHEUMATOLOGY | Facility: CLINIC | Age: 59
End: 2024-08-28

## 2024-08-28 ENCOUNTER — APPOINTMENT (OUTPATIENT)
Dept: BEHAVIORAL HEALTH | Facility: CLINIC | Age: 59
End: 2024-08-28
Payer: COMMERCIAL

## 2024-08-28 DIAGNOSIS — F41.1 GAD (GENERALIZED ANXIETY DISORDER): ICD-10-CM

## 2024-08-28 DIAGNOSIS — F41.0 PANIC ATTACK: ICD-10-CM

## 2024-08-28 DIAGNOSIS — F33.1 MODERATE EPISODE OF RECURRENT MAJOR DEPRESSIVE DISORDER (MULTI): ICD-10-CM

## 2024-08-28 PROCEDURE — 90833 PSYTX W PT W E/M 30 MIN: CPT | Performed by: PSYCHIATRY & NEUROLOGY

## 2024-08-28 PROCEDURE — 99214 OFFICE O/P EST MOD 30 MIN: CPT | Performed by: PSYCHIATRY & NEUROLOGY

## 2024-08-28 NOTE — TELEPHONE ENCOUNTER
Call placed to patient to make aware of recently resulted labs. Unable to establish direct contact and voicemail left. This nurse communicated that her tests showed normal inflammatory markers and some active lupus findings per provider, Dr. Cuenca. She will continue same dose Plaquenil. Her x-rays showed degenerative changes of osteoarthritis, but her hip x-rays resulted normal. Also made aware that she can see PT and Pain Medicine. Encouraged to call office with any questions she may have.
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
## 2024-09-18 RX ORDER — GABAPENTIN 300 MG/1
300 CAPSULE ORAL 3 TIMES DAILY
Qty: 90 CAPSULE | Refills: 2 | Status: SHIPPED | OUTPATIENT
Start: 2024-09-18

## 2024-09-21 DIAGNOSIS — F41.0 PANIC ATTACK: ICD-10-CM

## 2024-09-23 RX ORDER — LORAZEPAM 0.5 MG/1
0.5 TABLET ORAL DAILY PRN
Qty: 15 TABLET | Refills: 0 | Status: SHIPPED | OUTPATIENT
Start: 2024-09-23 | End: 2024-10-23

## 2024-10-28 DIAGNOSIS — F41.0 PANIC ATTACK: ICD-10-CM

## 2024-10-28 RX ORDER — LORAZEPAM 0.5 MG/1
0.5 TABLET ORAL DAILY PRN
Qty: 15 TABLET | Refills: 0 | OUTPATIENT
Start: 2024-10-28 | End: 2024-11-27

## 2024-10-30 ENCOUNTER — APPOINTMENT (OUTPATIENT)
Dept: BEHAVIORAL HEALTH | Facility: CLINIC | Age: 59
End: 2024-10-30
Payer: COMMERCIAL

## 2024-10-30 DIAGNOSIS — F41.0 PANIC ATTACK: ICD-10-CM

## 2024-10-30 DIAGNOSIS — F33.1 MODERATE EPISODE OF RECURRENT MAJOR DEPRESSIVE DISORDER: ICD-10-CM

## 2024-10-30 DIAGNOSIS — F33.0 MILD EPISODE OF RECURRENT MAJOR DEPRESSIVE DISORDER (CMS-HCC): ICD-10-CM

## 2024-10-30 DIAGNOSIS — F41.1 GAD (GENERALIZED ANXIETY DISORDER): ICD-10-CM

## 2024-10-30 DIAGNOSIS — M54.16 LUMBAR RADICULOPATHY: ICD-10-CM

## 2024-10-30 DIAGNOSIS — Z51.81 ENCOUNTER FOR MEDICATION MONITORING: ICD-10-CM

## 2024-10-30 PROCEDURE — 90833 PSYTX W PT W E/M 30 MIN: CPT | Performed by: PSYCHIATRY & NEUROLOGY

## 2024-10-30 PROCEDURE — 99214 OFFICE O/P EST MOD 30 MIN: CPT | Performed by: PSYCHIATRY & NEUROLOGY

## 2024-10-30 RX ORDER — DULOXETIN HYDROCHLORIDE 60 MG/1
60 CAPSULE, DELAYED RELEASE ORAL DAILY
Qty: 30 CAPSULE | Refills: 5 | Status: SHIPPED | OUTPATIENT
Start: 2024-10-30

## 2024-11-27 DIAGNOSIS — M32.9 SYSTEMIC LUPUS ERYTHEMATOSUS, UNSPECIFIED SLE TYPE, UNSPECIFIED ORGAN INVOLVEMENT STATUS (MULTI): Primary | ICD-10-CM

## 2024-11-29 RX ORDER — GABAPENTIN 300 MG/1
300 CAPSULE ORAL 3 TIMES DAILY
Qty: 90 CAPSULE | Refills: 2 | Status: SHIPPED | OUTPATIENT
Start: 2024-11-29

## 2025-01-13 DIAGNOSIS — M32.15 OTHER SYSTEMIC LUPUS ERYTHEMATOSUS WITH TUBULO-INTERSTITIAL NEPHROPATHY: ICD-10-CM

## 2025-01-13 RX ORDER — HYDROXYCHLOROQUINE SULFATE 200 MG/1
200 TABLET, FILM COATED ORAL 2 TIMES DAILY
Qty: 60 TABLET | Refills: 0 | Status: SHIPPED | OUTPATIENT
Start: 2025-01-13 | End: 2025-07-12

## 2025-01-23 ENCOUNTER — OFFICE VISIT (OUTPATIENT)
Dept: BEHAVIORAL HEALTH | Facility: HOSPITAL | Age: 60
End: 2025-01-23
Payer: COMMERCIAL

## 2025-01-23 VITALS
WEIGHT: 199.5 LBS | DIASTOLIC BLOOD PRESSURE: 73 MMHG | HEART RATE: 90 BPM | BODY MASS INDEX: 38.96 KG/M2 | OXYGEN SATURATION: 94 % | RESPIRATION RATE: 16 BRPM | TEMPERATURE: 96.6 F | SYSTOLIC BLOOD PRESSURE: 121 MMHG

## 2025-01-23 DIAGNOSIS — Z51.81 ENCOUNTER FOR MEDICATION MONITORING: ICD-10-CM

## 2025-01-23 DIAGNOSIS — F41.0 PANIC ATTACK: ICD-10-CM

## 2025-01-23 DIAGNOSIS — F33.0 MILD EPISODE OF RECURRENT MAJOR DEPRESSIVE DISORDER (CMS-HCC): ICD-10-CM

## 2025-01-23 DIAGNOSIS — F41.1 GAD (GENERALIZED ANXIETY DISORDER): ICD-10-CM

## 2025-01-23 PROCEDURE — 3078F DIAST BP <80 MM HG: CPT | Performed by: PSYCHIATRY & NEUROLOGY

## 2025-01-23 PROCEDURE — 3074F SYST BP LT 130 MM HG: CPT | Performed by: PSYCHIATRY & NEUROLOGY

## 2025-01-23 PROCEDURE — 90833 PSYTX W PT W E/M 30 MIN: CPT | Performed by: PSYCHIATRY & NEUROLOGY

## 2025-01-23 PROCEDURE — 99214 OFFICE O/P EST MOD 30 MIN: CPT | Performed by: PSYCHIATRY & NEUROLOGY

## 2025-01-23 PROCEDURE — 99214 OFFICE O/P EST MOD 30 MIN: CPT | Mod: AM | Performed by: PSYCHIATRY & NEUROLOGY

## 2025-01-23 PROCEDURE — 90833 PSYTX W PT W E/M 30 MIN: CPT | Mod: AM | Performed by: PSYCHIATRY & NEUROLOGY

## 2025-01-23 RX ORDER — LORAZEPAM 0.5 MG/1
0.5 TABLET ORAL DAILY PRN
Qty: 15 TABLET | Refills: 1 | Status: SHIPPED | OUTPATIENT
Start: 2025-01-23 | End: 2025-02-22

## 2025-01-23 ASSESSMENT — PAIN SCALES - GENERAL: PAINLEVEL_OUTOF10: 7

## 2025-02-11 DIAGNOSIS — M32.9 SYSTEMIC LUPUS ERYTHEMATOSUS, UNSPECIFIED SLE TYPE, UNSPECIFIED ORGAN INVOLVEMENT STATUS (MULTI): ICD-10-CM

## 2025-02-11 RX ORDER — GABAPENTIN 300 MG/1
300 CAPSULE ORAL 3 TIMES DAILY
Qty: 90 CAPSULE | Refills: 2 | Status: SHIPPED | OUTPATIENT
Start: 2025-02-11

## 2025-02-20 DIAGNOSIS — M32.15 OTHER SYSTEMIC LUPUS ERYTHEMATOSUS WITH TUBULO-INTERSTITIAL NEPHROPATHY: ICD-10-CM

## 2025-02-21 RX ORDER — HYDROXYCHLOROQUINE SULFATE 200 MG/1
200 TABLET, FILM COATED ORAL 2 TIMES DAILY
Qty: 60 TABLET | Refills: 0 | Status: SHIPPED | OUTPATIENT
Start: 2025-02-21 | End: 2025-08-20

## 2025-03-12 ENCOUNTER — APPOINTMENT (OUTPATIENT)
Dept: BEHAVIORAL HEALTH | Facility: CLINIC | Age: 60
End: 2025-03-12
Payer: COMMERCIAL

## 2025-03-12 DIAGNOSIS — F33.0 MILD EPISODE OF RECURRENT MAJOR DEPRESSIVE DISORDER (CMS-HCC): ICD-10-CM

## 2025-03-12 DIAGNOSIS — Z51.81 ENCOUNTER FOR MEDICATION MONITORING: ICD-10-CM

## 2025-03-12 DIAGNOSIS — F41.1 GAD (GENERALIZED ANXIETY DISORDER): ICD-10-CM

## 2025-03-12 DIAGNOSIS — F41.0 PANIC ATTACK: ICD-10-CM

## 2025-03-12 PROCEDURE — 99214 OFFICE O/P EST MOD 30 MIN: CPT | Performed by: PSYCHIATRY & NEUROLOGY

## 2025-03-12 RX ORDER — DULOXETIN HYDROCHLORIDE 60 MG/1
60 CAPSULE, DELAYED RELEASE ORAL DAILY
Qty: 30 CAPSULE | Refills: 5 | Status: SHIPPED | OUTPATIENT
Start: 2025-03-12

## 2025-03-12 RX ORDER — LORAZEPAM 0.5 MG/1
0.5 TABLET ORAL DAILY PRN
Qty: 15 TABLET | Refills: 1 | Status: SHIPPED | OUTPATIENT
Start: 2025-03-12 | End: 2025-04-11
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
## 2025-03-13 DIAGNOSIS — Z51.81 ENCOUNTER FOR MEDICATION MONITORING: ICD-10-CM

## 2025-03-20 ENCOUNTER — APPOINTMENT (OUTPATIENT)
Dept: RHEUMATOLOGY | Facility: CLINIC | Age: 60
End: 2025-03-20
Payer: COMMERCIAL

## 2025-03-20 VITALS
WEIGHT: 199 LBS | HEART RATE: 84 BPM | SYSTOLIC BLOOD PRESSURE: 119 MMHG | TEMPERATURE: 98.5 F | DIASTOLIC BLOOD PRESSURE: 74 MMHG | BODY MASS INDEX: 38.86 KG/M2

## 2025-03-20 DIAGNOSIS — M19.90 OSTEOARTHRITIS, UNSPECIFIED OSTEOARTHRITIS TYPE, UNSPECIFIED SITE: ICD-10-CM

## 2025-03-20 DIAGNOSIS — R42 VERTIGO: Primary | ICD-10-CM

## 2025-03-20 DIAGNOSIS — M32.9 SYSTEMIC LUPUS ERYTHEMATOSUS, UNSPECIFIED SLE TYPE, UNSPECIFIED ORGAN INVOLVEMENT STATUS (MULTI): Primary | ICD-10-CM

## 2025-03-20 DIAGNOSIS — M32.9 SYSTEMIC LUPUS ERYTHEMATOSUS, UNSPECIFIED SLE TYPE, UNSPECIFIED ORGAN INVOLVEMENT STATUS (MULTI): ICD-10-CM

## 2025-03-20 DIAGNOSIS — M54.16 LUMBAR RADICULOPATHY: ICD-10-CM

## 2025-03-20 DIAGNOSIS — M32.15 OTHER SYSTEMIC LUPUS ERYTHEMATOSUS WITH TUBULO-INTERSTITIAL NEPHROPATHY: ICD-10-CM

## 2025-03-20 PROCEDURE — 99214 OFFICE O/P EST MOD 30 MIN: CPT | Performed by: INTERNAL MEDICINE

## 2025-03-20 PROCEDURE — 3078F DIAST BP <80 MM HG: CPT | Performed by: INTERNAL MEDICINE

## 2025-03-20 PROCEDURE — 3074F SYST BP LT 130 MM HG: CPT | Performed by: INTERNAL MEDICINE

## 2025-03-20 RX ORDER — GABAPENTIN 300 MG/1
300 CAPSULE ORAL 3 TIMES DAILY
Qty: 90 CAPSULE | Refills: 2 | Status: SHIPPED | OUTPATIENT
Start: 2025-03-20

## 2025-03-20 RX ORDER — HYDROXYCHLOROQUINE SULFATE 200 MG/1
200 TABLET, FILM COATED ORAL 2 TIMES DAILY
Qty: 60 TABLET | Refills: 0 | Status: SHIPPED | OUTPATIENT
Start: 2025-03-20 | End: 2025-09-16

## 2025-03-20 RX ORDER — DICLOFENAC SODIUM 10 MG/G
4 GEL TOPICAL 4 TIMES DAILY
Qty: 50 G | Refills: 1 | Status: SHIPPED | OUTPATIENT
Start: 2025-03-20

## 2025-03-20 RX ORDER — MECLIZINE HYDROCHLORIDE 25 MG/1
25 TABLET ORAL EVERY 8 HOURS
Qty: 180 TABLET | Refills: 0 | Status: SHIPPED | OUTPATIENT
Start: 2025-03-20 | End: 2025-05-19

## 2025-03-20 NOTE — PROGRESS NOTES
Subjective   Patient ID: Lisa Abreu is a 59 y.o. female who presents for No chief complaint on file..    HPI  This is a 59-year-old female with a PMH of SLE (SHAHEED of 1:1280, +Aguilar, +SSA, & +RNP), inflammatory arthritis), +RF fibromyalgia, & osteoarthritis presents for follow-up appointment.      Current meds:  HCQ 200mg bid   Duloxetine 60 mg daily  Gabapentin 100mg daily - rx by pain management     02/2023:  She c/o pain in her b/l shoulders,and b/l knee. She used to f/u with physical therapy. But last few weeks she couldn't due to grand kids issues. She has morning stiffness that lasts for few mins. She denies any rashes, oral or nasal ulcers or alopecia. But says she still has Raynaud's phenomena, dry eyes and dry mouth. She hasn't seen an ophthalmologist for about 1.5 yrs now, but denies any side effects to Plaquenil.      She had an xray of L shoulder which showed degenerative changes. She has right shoulder pain and MRI right shoulder had shown small full thickness rotator cuff tear (supra and infra spinatus)     08/2023: Today, she feels extremely tired, exhausted. She reports generalized body, muscle pain.  But, she does not have any other active lupus findings, she denies oral ulcer, skin rashes, joint pain.     08/24:  She reports joint pain in her shoulders, knees and lower back  Also, she is having intermittent joint pain in her hands  Also, she has also intermittent rash, hives like lesions.  She endorses some photosensitivity, but not so much    03/25:  She reports hand joint pain and muscle pain and back pain  Her AM stiffness lasts 30 min    Current med:   mg daily, no recent eye exam    ROS  Joint pain in hands: negative   Joint swelling: negative  Morning stiffness and duration: negative   strength: normal  Oral ulcer: negative  Genital ulcer: negative  Raynaud phenomenon: negative  Chest pain/dyspnea: negative  Low back pain: negative  Visual problem: negative  Dry eyes/dry  mouth: negative  Skin rash/scaling/psoriasis: negative       Objective     PEXAM  VS reviewed, WNL  General: Alert, no distress   HEENT: Normocephalic/atraumatic, No alopecia. PERRLA. Sclera white, conjunctiva pink, no malar rash. no oral or nasal ulcer. Oral cavity pink and moist, no erythema or exudate, dentition good.   Neck: supple  Respiratory: CTA B, no adventitious breath sounds  Cardiac: RRR, no murmurs, carotid, or bruits  Abdominal: symmetrical, soft, non-tender, non-distended, normoactive BSx4 quadrants, no CVA tenderness or suprapubic tenderness  MSK: Joints of upper and lower extremities were assessed for synovitis and ROM.    Today she has no evidence of synovitis in the joints of her hands or wrists, tender joint count 0, swollen joint count 0   Extremities: no clubbing, no cyanosis, no edema  Skin: Skin warm and moist.   Neuro: non-focal, Strength 5/5 throughout. Normal gait. No cerebellar pathologic exam     Assessment/Plan   This is a 59-year-old female with a PMH of SLE (SHAHEED of 1:1280, +Aguilar, +SSA, & +RNP, inflammatory arthritis ), + RF, fibromyalgia, & knee osteoarthritis presents for follow-up appointment.      Her x-ray b/l knee suggested DJD and MRI-right shoulder in 2018 showed tear in her supra spinatus and infra spinatus.      Last HCQ eye exam 04/2021 no toxicity.     #SLE - in remission  -Will continue HCQ 200mg bid - refills given  -Provided a referral to f/u with opthalmology   -repeat labs ordered - cbc, cmp, ESR, CRP, ds-dna, urine protein/creatinine     #fibromyalgia  recommended her regular exercise  -will continue Duloxetine     #osteoarthritis  -will see her hip and knee x-rays  -PT referral for scooter evaluation  -will think about surgery and Orthopedist referral    RTC 4 months

## 2025-03-21 LAB
ALBUMIN SERPL-MCNC: 4.1 G/DL (ref 3.6–5.1)
ALP SERPL-CCNC: 115 U/L (ref 37–153)
ALT SERPL-CCNC: 7 U/L (ref 6–29)
ANION GAP SERPL CALCULATED.4IONS-SCNC: 8 MMOL/L (CALC) (ref 7–17)
AST SERPL-CCNC: 15 U/L (ref 10–35)
BASOPHILS # BLD AUTO: 53 CELLS/UL (ref 0–200)
BASOPHILS NFR BLD AUTO: 1.1 %
BILIRUB SERPL-MCNC: 0.4 MG/DL (ref 0.2–1.2)
BUN SERPL-MCNC: 11 MG/DL (ref 7–25)
C3 SERPL-MCNC: 142 MG/DL (ref 83–193)
C4 SERPL-MCNC: 26 MG/DL (ref 15–57)
CALCIUM SERPL-MCNC: 9.5 MG/DL (ref 8.6–10.4)
CHLORIDE SERPL-SCNC: 97 MMOL/L (ref 98–110)
CO2 SERPL-SCNC: 31 MMOL/L (ref 20–32)
CREAT SERPL-MCNC: 0.7 MG/DL (ref 0.5–1.05)
CRP SERPL-MCNC: 9.4 MG/L
DSDNA AB SER-ACNC: 159 IU/ML
EGFRCR SERPLBLD CKD-EPI 2021: 99 ML/MIN/1.73M2
EOSINOPHIL # BLD AUTO: 82 CELLS/UL (ref 15–500)
EOSINOPHIL NFR BLD AUTO: 1.7 %
ERYTHROCYTE [DISTWIDTH] IN BLOOD BY AUTOMATED COUNT: 12.7 % (ref 11–15)
ERYTHROCYTE [SEDIMENTATION RATE] IN BLOOD BY WESTERGREN METHOD: 117 MM/H
GLUCOSE SERPL-MCNC: 103 MG/DL (ref 65–99)
HCT VFR BLD AUTO: 44.2 % (ref 35–45)
HGB BLD-MCNC: 14.7 G/DL (ref 11.7–15.5)
LYMPHOCYTES # BLD AUTO: 1613 CELLS/UL (ref 850–3900)
LYMPHOCYTES NFR BLD AUTO: 33.6 %
MCH RBC QN AUTO: 29.8 PG (ref 27–33)
MCHC RBC AUTO-ENTMCNC: 33.3 G/DL (ref 32–36)
MCV RBC AUTO: 89.7 FL (ref 80–100)
MONOCYTES # BLD AUTO: 422 CELLS/UL (ref 200–950)
MONOCYTES NFR BLD AUTO: 8.8 %
NEUTROPHILS # BLD AUTO: 2630 CELLS/UL (ref 1500–7800)
NEUTROPHILS NFR BLD AUTO: 54.8 %
PLATELET # BLD AUTO: 496 THOUSAND/UL (ref 140–400)
PMV BLD REES-ECKER: 9.6 FL (ref 7.5–12.5)
POTASSIUM SERPL-SCNC: 3.9 MMOL/L (ref 3.5–5.3)
PROT SERPL-MCNC: 8.4 G/DL (ref 6.1–8.1)
RBC # BLD AUTO: 4.93 MILLION/UL (ref 3.8–5.1)
SODIUM SERPL-SCNC: 136 MMOL/L (ref 135–146)
WBC # BLD AUTO: 4.8 THOUSAND/UL (ref 3.8–10.8)

## 2025-03-23 LAB
1OH-MIDAZOLAM UR-MCNC: NEGATIVE NG/ML
7AMINOCLONAZEPAM UR-MCNC: NEGATIVE NG/ML
A-OH ALPRAZ UR-MCNC: NEGATIVE NG/ML
A-OH-TRIAZOLAM UR-MCNC: NEGATIVE NG/ML
AMPHETAMINES UR QL: NEGATIVE NG/ML
BARBITURATES UR QL: NEGATIVE NG/ML
BZE UR QL: NEGATIVE NG/ML
CODEINE UR-MCNC: NEGATIVE NG/ML
CREAT UR-MCNC: 141 MG/DL
DRUG SCREEN COMMENT UR-IMP: NORMAL
EDDP UR-MCNC: NEGATIVE NG/ML
FENTANYL UR-MCNC: NEGATIVE NG/ML
HYDROCODONE UR-MCNC: NEGATIVE NG/ML
HYDROMORPHONE UR-MCNC: NEGATIVE NG/ML
LORAZEPAM UR-MCNC: NEGATIVE NG/ML
METHADONE UR-MCNC: NEGATIVE NG/ML
MORPHINE UR-MCNC: NEGATIVE NG/ML
NORDIAZEPAM UR-MCNC: NEGATIVE NG/ML
NORFENTANYL UR-MCNC: NEGATIVE NG/ML
NORHYDROCODONE UR CFM-MCNC: NEGATIVE NG/ML
NOROXYCODONE UR CFM-MCNC: NEGATIVE NG/ML
NORTRAMADOL UR-MCNC: NEGATIVE NG/ML
OH-ETHYLFLURAZ UR-MCNC: NEGATIVE NG/ML
OXAZEPAM UR-MCNC: NEGATIVE NG/ML
OXIDANTS UR QL: NEGATIVE MCG/ML
OXYCODONE UR CFM-MCNC: NEGATIVE NG/ML
OXYMORPHONE UR CFM-MCNC: NEGATIVE NG/ML
PCP UR QL: NEGATIVE NG/ML
PH UR: 6.6 [PH] (ref 4.5–9)
QUEST 6 ACETYLMORPHINE: NEGATIVE NG/ML
QUEST NOTES AND COMMENTS: NORMAL
QUEST ZOLPIDEM: NEGATIVE NG/ML
TEMAZEPAM UR-MCNC: NEGATIVE NG/ML
THC UR QL: NEGATIVE NG/ML
TRAMADOL UR-MCNC: NEGATIVE NG/ML
ZOLPIDEM PHENYL-4-CARB UR CFM-MCNC: NEGATIVE NG/ML

## 2025-03-24 ENCOUNTER — TELEPHONE (OUTPATIENT)
Dept: RHEUMATOLOGY | Facility: CLINIC | Age: 60
End: 2025-03-24
Payer: COMMERCIAL

## 2025-03-24 NOTE — TELEPHONE ENCOUNTER
Placed call to patient, no answer. Patient does not have MyChart, voicemail left for patient. Patient made aware that Dr. Cuenca has reviewed her lab results. The tests show stable elevated lupus markers. Dr. Cuenca does not want to change anything in her treatment unless there is a clinical flare. She will continue Plaquenil twice daily.   Patient instructed to contact the office if she has any new clinical findings like joint pain or skin rashes. Patient encouraged to contact the office with any questions or concerns.

## 2025-04-23 ENCOUNTER — APPOINTMENT (OUTPATIENT)
Dept: BEHAVIORAL HEALTH | Facility: CLINIC | Age: 60
End: 2025-04-23
Payer: COMMERCIAL

## 2025-04-23 DIAGNOSIS — F33.0 MILD EPISODE OF RECURRENT MAJOR DEPRESSIVE DISORDER (CMS-HCC): ICD-10-CM

## 2025-04-23 DIAGNOSIS — F41.0 PANIC ATTACK: ICD-10-CM

## 2025-04-23 DIAGNOSIS — F41.1 GAD (GENERALIZED ANXIETY DISORDER): ICD-10-CM

## 2025-04-23 PROCEDURE — 99214 OFFICE O/P EST MOD 30 MIN: CPT | Performed by: PSYCHIATRY & NEUROLOGY

## 2025-04-23 RX ORDER — LORAZEPAM 0.5 MG/1
0.5 TABLET ORAL DAILY PRN
Qty: 15 TABLET | Refills: 1 | Status: SHIPPED | OUTPATIENT
Start: 2025-04-23 | End: 2025-05-23
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
## 2025-05-10 DIAGNOSIS — M32.15 OTHER SYSTEMIC LUPUS ERYTHEMATOSUS WITH TUBULO-INTERSTITIAL NEPHROPATHY: ICD-10-CM

## 2025-05-12 RX ORDER — HYDROXYCHLOROQUINE SULFATE 200 MG/1
200 TABLET, FILM COATED ORAL 2 TIMES DAILY
Qty: 60 TABLET | Refills: 1 | Status: SHIPPED | OUTPATIENT
Start: 2025-05-12 | End: 2025-11-08

## 2025-05-14 ENCOUNTER — APPOINTMENT (OUTPATIENT)
Dept: BEHAVIORAL HEALTH | Facility: CLINIC | Age: 60
End: 2025-05-14
Payer: COMMERCIAL

## 2025-06-04 ENCOUNTER — APPOINTMENT (OUTPATIENT)
Dept: BEHAVIORAL HEALTH | Facility: CLINIC | Age: 60
End: 2025-06-04
Payer: COMMERCIAL

## 2025-06-04 DIAGNOSIS — F41.0 PANIC ATTACK: ICD-10-CM

## 2025-06-04 DIAGNOSIS — F33.0 MILD EPISODE OF RECURRENT MAJOR DEPRESSIVE DISORDER: ICD-10-CM

## 2025-06-04 DIAGNOSIS — F41.1 GAD (GENERALIZED ANXIETY DISORDER): ICD-10-CM

## 2025-06-04 PROCEDURE — 90833 PSYTX W PT W E/M 30 MIN: CPT | Performed by: PSYCHIATRY & NEUROLOGY

## 2025-06-04 PROCEDURE — 99214 OFFICE O/P EST MOD 30 MIN: CPT | Performed by: PSYCHIATRY & NEUROLOGY
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
## 2025-06-23 ENCOUNTER — APPOINTMENT (OUTPATIENT)
Dept: OPHTHALMOLOGY | Facility: CLINIC | Age: 60
End: 2025-06-23
Payer: COMMERCIAL

## 2025-07-07 ENCOUNTER — APPOINTMENT (OUTPATIENT)
Dept: BEHAVIORAL HEALTH | Facility: CLINIC | Age: 60
End: 2025-07-07
Payer: COMMERCIAL

## 2025-07-24 ENCOUNTER — APPOINTMENT (OUTPATIENT)
Dept: RHEUMATOLOGY | Facility: CLINIC | Age: 60
End: 2025-07-24
Payer: COMMERCIAL

## 2025-07-31 ENCOUNTER — APPOINTMENT (OUTPATIENT)
Dept: RHEUMATOLOGY | Facility: CLINIC | Age: 60
End: 2025-07-31
Payer: COMMERCIAL

## 2025-07-31 VITALS
SYSTOLIC BLOOD PRESSURE: 124 MMHG | BODY MASS INDEX: 37.89 KG/M2 | DIASTOLIC BLOOD PRESSURE: 83 MMHG | HEART RATE: 81 BPM | HEIGHT: 60 IN | WEIGHT: 193 LBS

## 2025-07-31 DIAGNOSIS — M32.9 SYSTEMIC LUPUS ERYTHEMATOSUS, UNSPECIFIED SLE TYPE, UNSPECIFIED ORGAN INVOLVEMENT STATUS (MULTI): Primary | ICD-10-CM

## 2025-07-31 DIAGNOSIS — F33.0 MILD EPISODE OF RECURRENT MAJOR DEPRESSIVE DISORDER: ICD-10-CM

## 2025-07-31 DIAGNOSIS — F41.1 GAD (GENERALIZED ANXIETY DISORDER): ICD-10-CM

## 2025-07-31 DIAGNOSIS — J44.1 CHRONIC OBSTRUCTIVE PULMONARY DISEASE WITH ACUTE EXACERBATION (MULTI): ICD-10-CM

## 2025-07-31 DIAGNOSIS — I10 PRIMARY HYPERTENSION: ICD-10-CM

## 2025-07-31 DIAGNOSIS — M54.16 LUMBAR RADICULOPATHY: ICD-10-CM

## 2025-07-31 DIAGNOSIS — M79.7 FIBROMYALGIA: ICD-10-CM

## 2025-07-31 DIAGNOSIS — M19.90 OSTEOARTHRITIS, UNSPECIFIED OSTEOARTHRITIS TYPE, UNSPECIFIED SITE: ICD-10-CM

## 2025-07-31 DIAGNOSIS — M32.15 OTHER SYSTEMIC LUPUS ERYTHEMATOSUS WITH TUBULO-INTERSTITIAL NEPHROPATHY: ICD-10-CM

## 2025-07-31 PROCEDURE — 3008F BODY MASS INDEX DOCD: CPT | Performed by: INTERNAL MEDICINE

## 2025-07-31 PROCEDURE — 99214 OFFICE O/P EST MOD 30 MIN: CPT | Performed by: INTERNAL MEDICINE

## 2025-07-31 PROCEDURE — 3074F SYST BP LT 130 MM HG: CPT | Performed by: INTERNAL MEDICINE

## 2025-07-31 PROCEDURE — 3079F DIAST BP 80-89 MM HG: CPT | Performed by: INTERNAL MEDICINE

## 2025-07-31 RX ORDER — LIDOCAINE 560 MG/1
1 PATCH PERCUTANEOUS; TOPICAL; TRANSDERMAL DAILY
Qty: 14 PATCH | Refills: 1 | Status: SHIPPED | OUTPATIENT
Start: 2025-07-31 | End: 2025-10-29

## 2025-07-31 RX ORDER — DEXTROMETHORPHAN POLISTIREX 30 MG/5ML
SUSPENSION ORAL
COMMUNITY
Start: 2025-06-07

## 2025-07-31 RX ORDER — HYDROXYCHLOROQUINE SULFATE 200 MG/1
200 TABLET, FILM COATED ORAL 2 TIMES DAILY
Qty: 180 TABLET | Refills: 1 | Status: SHIPPED | OUTPATIENT
Start: 2025-07-31 | End: 2026-01-27

## 2025-07-31 RX ORDER — DULOXETIN HYDROCHLORIDE 60 MG/1
60 CAPSULE, DELAYED RELEASE ORAL DAILY
Qty: 90 CAPSULE | Refills: 1 | Status: SHIPPED | OUTPATIENT
Start: 2025-07-31 | End: 2026-01-27

## 2025-07-31 RX ORDER — BENZONATATE 200 MG/1
CAPSULE ORAL
COMMUNITY
Start: 2025-07-24

## 2025-07-31 RX ORDER — LOSARTAN POTASSIUM 25 MG/1
25 TABLET ORAL DAILY
Qty: 90 TABLET | Refills: 1 | Status: SHIPPED | OUTPATIENT
Start: 2025-07-31 | End: 2026-01-27

## 2025-07-31 RX ORDER — LIDOCAINE 560 MG/1
1 PATCH PERCUTANEOUS; TOPICAL; TRANSDERMAL DAILY
Qty: 14 PATCH | Refills: 1 | Status: SHIPPED | OUTPATIENT
Start: 2025-07-31 | End: 2025-07-31 | Stop reason: SDUPTHER

## 2025-07-31 RX ORDER — HYDROCHLOROTHIAZIDE 25 MG/1
25 TABLET ORAL DAILY
Qty: 90 TABLET | Refills: 1 | Status: SHIPPED | OUTPATIENT
Start: 2025-07-31 | End: 2026-01-27

## 2025-07-31 RX ORDER — ALBUTEROL SULFATE 90 UG/1
2 INHALANT RESPIRATORY (INHALATION) EVERY 6 HOURS PRN
Qty: 18 G | Refills: 11 | Status: SHIPPED | OUTPATIENT
Start: 2025-07-31

## 2025-07-31 ASSESSMENT — ENCOUNTER SYMPTOMS
DEPRESSION: 0
LOSS OF SENSATION IN FEET: 0
OCCASIONAL FEELINGS OF UNSTEADINESS: 0

## 2025-07-31 ASSESSMENT — PATIENT HEALTH QUESTIONNAIRE - PHQ9
1. LITTLE INTEREST OR PLEASURE IN DOING THINGS: NOT AT ALL
SUM OF ALL RESPONSES TO PHQ9 QUESTIONS 1 & 2: 0
2. FEELING DOWN, DEPRESSED OR HOPELESS: NOT AT ALL

## 2025-07-31 NOTE — PROGRESS NOTES
Subjective   Patient ID: Lisa Abreu is a 60 y.o. female who presents for Follow-up.    HPI  This is a 59-year-old female with a PMH of SLE (SHAHEED of 1:1280, +Aguilar, +SSA, & +RNP), inflammatory arthritis), +RF fibromyalgia, & osteoarthritis presents for follow-up appointment.      Current meds:  HCQ 200mg bid   Duloxetine 60 mg daily  Gabapentin 100mg daily - rx by pain management     02/2023:  She c/o pain in her b/l shoulders,and b/l knee. She used to f/u with physical therapy. But last few weeks she couldn't due to grand kids issues. She has morning stiffness that lasts for few mins. She denies any rashes, oral or nasal ulcers or alopecia. But says she still has Raynaud's phenomena, dry eyes and dry mouth. She hasn't seen an ophthalmologist for about 1.5 yrs now, but denies any side effects to Plaquenil.      She had an xray of L shoulder which showed degenerative changes. She has right shoulder pain and MRI right shoulder had shown small full thickness rotator cuff tear (supra and infra spinatus)     08/2023: Today, she feels extremely tired, exhausted. She reports generalized body, muscle pain.  But, she does not have any other active lupus findings, she denies oral ulcer, skin rashes, joint pain.     08/24:  She reports joint pain in her shoulders, knees and lower back  Also, she is having intermittent joint pain in her hands  Also, she has also intermittent rash, hives like lesions.  She endorses some photosensitivity, but not so much    07/25:  She reports hand joint pain and muscle pain and back pain  Her AM stiffness lasts 30 min    Current med:   mg daily, no recent eye exam    ROS  Joint pain in hands: negative   Joint swelling: negative  Morning stiffness and duration: negative   strength: normal  Oral ulcer: negative  Genital ulcer: negative  Raynaud phenomenon: negative  Chest pain/dyspnea: negative  Low back pain: negative  Visual problem: negative  Dry eyes/dry mouth: negative  Skin  rash/scaling/psoriasis: negative       Objective     PEXAM  VS reviewed, WNL  General: Alert, no distress   HEENT: Normocephalic/atraumatic, No alopecia. PERRLA. Sclera white, conjunctiva pink, no malar rash. no oral or nasal ulcer. Oral cavity pink and moist, no erythema or exudate, dentition good.   Neck: supple  Respiratory: CTA B, no adventitious breath sounds  Cardiac: RRR, no murmurs, carotid, or bruits  Abdominal: symmetrical, soft, non-tender, non-distended, normoactive BSx4 quadrants, no CVA tenderness or suprapubic tenderness  MSK: Joints of upper and lower extremities were assessed for synovitis and ROM.    Today she has no evidence of synovitis in the joints of her hands or wrists, tender joint count 0, swollen joint count 0   Extremities: no clubbing, no cyanosis, no edema  Skin: Skin warm and moist.   Neuro: non-focal, Strength 5/5 throughout. Normal gait. No cerebellar pathologic exam     Assessment/Plan   This is a 59-year-old female with a PMH of SLE (SHAHEED of 1:1280, +Aguilar, +SSA, & +RNP, inflammatory arthritis ), + RF, fibromyalgia, & knee osteoarthritis presents for follow-up appointment.      Her x-ray b/l knee suggested DJD and MRI-right shoulder in 2018 showed tear in her supra spinatus and infra spinatus.      Last HCQ eye exam 04/2021 no toxicity.  Will see Ophthalmology next month     #SLE - in remission  -Will continue HCQ 200mg bid - refills given  -Provided a referral to f/u with opthalmology   -repeat labs ordered - cbc, cmp, ESR, CRP, ds-dna, urine protein/creatinine     #fibromyalgia  recommended her regular exercise  -will continue Duloxetine     #osteoarthritis  -will use Tylenol, Lidocaine patch  -PT referral for scooter evaluation  -will think about surgery and Orthopedist referral    RTC 6 months

## 2025-08-05 DIAGNOSIS — M32.9 SYSTEMIC LUPUS ERYTHEMATOSUS, UNSPECIFIED SLE TYPE, UNSPECIFIED ORGAN INVOLVEMENT STATUS (MULTI): ICD-10-CM

## 2025-08-05 DIAGNOSIS — F41.0 PANIC ATTACK: ICD-10-CM

## 2025-08-05 RX ORDER — LORAZEPAM 0.5 MG/1
0.5 TABLET ORAL NIGHTLY PRN
Qty: 15 TABLET | Refills: 0 | Status: SHIPPED | OUTPATIENT
Start: 2025-08-05

## 2025-08-06 RX ORDER — GABAPENTIN 300 MG/1
300 CAPSULE ORAL 3 TIMES DAILY
Qty: 90 CAPSULE | Refills: 2 | Status: SHIPPED | OUTPATIENT
Start: 2025-08-06

## 2025-08-28 ENCOUNTER — APPOINTMENT (OUTPATIENT)
Dept: OPHTHALMOLOGY | Facility: CLINIC | Age: 60
End: 2025-08-28
Payer: COMMERCIAL

## 2025-08-28 DIAGNOSIS — Z79.899 ENCOUNTER FOR LONG-TERM (CURRENT) USE OF HIGH-RISK MEDICATION: ICD-10-CM

## 2025-08-28 DIAGNOSIS — H25.813 COMBINED FORMS OF AGE-RELATED CATARACT OF BOTH EYES: ICD-10-CM

## 2025-08-28 DIAGNOSIS — H52.03 HYPEROPIA WITH PRESBYOPIA OF BOTH EYES: Primary | ICD-10-CM

## 2025-08-28 DIAGNOSIS — H52.4 HYPEROPIA WITH PRESBYOPIA OF BOTH EYES: Primary | ICD-10-CM

## 2025-08-28 PROCEDURE — 92015 DETERMINE REFRACTIVE STATE: CPT | Performed by: OPTOMETRIST

## 2025-08-28 PROCEDURE — 92134 CPTRZ OPH DX IMG PST SGM RTA: CPT | Performed by: OPTOMETRIST

## 2025-08-28 PROCEDURE — 92083 EXTENDED VISUAL FIELD XM: CPT | Performed by: OPTOMETRIST

## 2025-08-28 PROCEDURE — 92004 COMPRE OPH EXAM NEW PT 1/>: CPT | Performed by: OPTOMETRIST

## 2025-08-28 ASSESSMENT — ENCOUNTER SYMPTOMS
HEMATOLOGIC/LYMPHATIC NEGATIVE: 0
ALLERGIC/IMMUNOLOGIC NEGATIVE: 0
PSYCHIATRIC NEGATIVE: 0
GASTROINTESTINAL NEGATIVE: 0
NEUROLOGICAL NEGATIVE: 1
CARDIOVASCULAR NEGATIVE: 1
CONSTITUTIONAL NEGATIVE: 0
EYES NEGATIVE: 1
RESPIRATORY NEGATIVE: 1
ENDOCRINE NEGATIVE: 0
MUSCULOSKELETAL NEGATIVE: 1

## 2025-08-28 ASSESSMENT — REFRACTION_MANIFEST
OD_SPHERE: +2.25
OD_CYLINDER: -0.50
OS_SPHERE: +1.50
OS_CYLINDER: SPHERE
OD_SPHERE: +2.00
METHOD_AUTOREFRACTION: 1
OD_CYLINDER: -0.75
OS_ADD: +2.25
OS_CYLINDER: -0.25
OS_SPHERE: +1.00
OD_AXIS: 085
OD_AXIS: 085
OD_ADD: +2.25
OS_AXIS: 179

## 2025-08-28 ASSESSMENT — CONF VISUAL FIELD
OD_SUPERIOR_NASAL_RESTRICTION: 0
OS_SUPERIOR_NASAL_RESTRICTION: 0
OD_INFERIOR_NASAL_RESTRICTION: 0
OS_NORMAL: 1
OS_INFERIOR_TEMPORAL_RESTRICTION: 0
OS_INFERIOR_NASAL_RESTRICTION: 0
OD_INFERIOR_TEMPORAL_RESTRICTION: 0
OS_SUPERIOR_TEMPORAL_RESTRICTION: 0
OD_SUPERIOR_TEMPORAL_RESTRICTION: 0
OD_NORMAL: 1

## 2025-08-28 ASSESSMENT — REFRACTION_WEARINGRX
OS_ADD: +2.25
OD_ADD: +2.25
OS_SPHERE: +1.00
OD_SPHERE: +1.75
SPECS_TYPE: BIFOCAL
OD_AXIS: 085
OS_CYLINDER: SPHERE
OD_CYLINDER: -0.50

## 2025-08-28 ASSESSMENT — TONOMETRY
OS_IOP_MMHG: 10
IOP_METHOD: GOLDMANN APPLANATION
OD_IOP_MMHG: 9

## 2025-08-28 ASSESSMENT — VISUAL ACUITY
OS_CC+: -2
OD_CC: 20/25
OS_CC: 20/20
OD_CC: 20/20
OS_CC: 20/20
METHOD: SNELLEN - LINEAR
OD_CC+: -2
CORRECTION_TYPE: GLASSES

## 2025-08-28 ASSESSMENT — SLIT LAMP EXAM - LIDS
COMMENTS: NORMAL
COMMENTS: NORMAL

## 2025-08-28 ASSESSMENT — EXTERNAL EXAM - LEFT EYE: OS_EXAM: NORMAL

## 2025-08-28 ASSESSMENT — EXTERNAL EXAM - RIGHT EYE: OD_EXAM: NORMAL

## 2025-08-28 ASSESSMENT — CUP TO DISC RATIO
OD_RATIO: 0.25
OS_RATIO: 0.25

## 2025-09-15 ENCOUNTER — APPOINTMENT (OUTPATIENT)
Dept: BEHAVIORAL HEALTH | Facility: CLINIC | Age: 60
End: 2025-09-15
Payer: COMMERCIAL

## 2026-02-05 ENCOUNTER — APPOINTMENT (OUTPATIENT)
Dept: RHEUMATOLOGY | Facility: CLINIC | Age: 61
End: 2026-02-05
Payer: COMMERCIAL

## 2026-09-03 ENCOUNTER — APPOINTMENT (OUTPATIENT)
Dept: OPHTHALMOLOGY | Facility: CLINIC | Age: 61
End: 2026-09-03
Payer: COMMERCIAL